# Patient Record
Sex: FEMALE | Race: WHITE | NOT HISPANIC OR LATINO | Employment: OTHER | URBAN - NONMETROPOLITAN AREA
[De-identification: names, ages, dates, MRNs, and addresses within clinical notes are randomized per-mention and may not be internally consistent; named-entity substitution may affect disease eponyms.]

---

## 2017-05-15 ENCOUNTER — TELEPHONE (OUTPATIENT)
Dept: GASTROENTEROLOGY | Facility: CLINIC | Age: 62
End: 2017-05-15

## 2017-06-13 ENCOUNTER — HOSPITAL ENCOUNTER (OUTPATIENT)
Dept: WOMENS IMAGING | Age: 62
Discharge: HOME | End: 2017-06-13
Payer: COMMERCIAL

## 2017-06-13 DIAGNOSIS — M85.80 OSTEOPENIA: ICD-10-CM

## 2017-06-13 PROCEDURE — 77080 DXA BONE DENSITY AXIAL: CPT

## 2018-07-10 ENCOUNTER — HOSPITAL ENCOUNTER (OUTPATIENT)
Dept: WOMENS IMAGING | Age: 63
Discharge: HOME | End: 2018-07-10
Payer: COMMERCIAL

## 2018-07-10 DIAGNOSIS — Z12.39 ENCOUNTER FOR OTHER SCREENING FOR MALIGNANT NEOPLASM OF BREAST: ICD-10-CM

## 2018-07-10 PROCEDURE — 77063 BREAST TOMOSYNTHESIS BI: CPT

## 2019-12-10 ENCOUNTER — HOSPITAL ENCOUNTER (OUTPATIENT)
Dept: WOMENS IMAGING | Age: 64
Discharge: HOME | End: 2019-12-10
Payer: COMMERCIAL

## 2019-12-10 DIAGNOSIS — M85.80 OSTEOPENIA, UNSPECIFIED LOCATION: ICD-10-CM

## 2019-12-10 DIAGNOSIS — Z12.31 ENCOUNTER FOR SCREENING MAMMOGRAM FOR MALIGNANT NEOPLASM OF BREAST: ICD-10-CM

## 2019-12-10 PROCEDURE — 77080 DXA BONE DENSITY AXIAL: CPT

## 2019-12-10 PROCEDURE — 77063 BREAST TOMOSYNTHESIS BI: CPT

## 2020-07-27 ENCOUNTER — TRANSCRIBE ORDERS (OUTPATIENT)
Dept: ADMINISTRATIVE | Facility: HOSPITAL | Age: 65
End: 2020-07-27

## 2020-07-27 DIAGNOSIS — R91.8 OTHER NONSPECIFIC ABNORMAL FINDING OF LUNG FIELD: Primary | ICD-10-CM

## 2020-08-31 ENCOUNTER — HOSPITAL ENCOUNTER (OUTPATIENT)
Dept: CT IMAGING | Facility: HOSPITAL | Age: 65
Discharge: HOME | End: 2020-08-31

## 2020-08-31 ENCOUNTER — TRANSCRIBE ORDERS (OUTPATIENT)
Dept: ADMINISTRATIVE | Facility: HOSPITAL | Age: 65
End: 2020-08-31

## 2020-08-31 ENCOUNTER — HOSPITAL ENCOUNTER (INPATIENT)
Facility: HOSPITAL | Age: 65
LOS: 2 days | Discharge: HOME | End: 2020-09-02
Attending: INTERNAL MEDICINE | Admitting: INTERNAL MEDICINE

## 2020-08-31 DIAGNOSIS — R06.00 DYSPNEA, UNSPECIFIED TYPE: ICD-10-CM

## 2020-08-31 DIAGNOSIS — R06.00 DYSPNEA, UNSPECIFIED TYPE: Primary | ICD-10-CM

## 2020-08-31 PROBLEM — R00.0 SINUS TACHYCARDIA: Status: ACTIVE | Noted: 2020-08-31

## 2020-08-31 PROBLEM — C34.90 ADENOCARCINOMA, LUNG (HCC): Status: ACTIVE | Noted: 2020-08-31

## 2020-08-31 PROBLEM — I26.99 PULMONARY EMBOLUS (HCC): Status: ACTIVE | Noted: 2020-08-31

## 2020-08-31 LAB
ALBUMIN SERPL-MCNC: 4.2 G/DL (ref 3.5–5.2)
ALBUMIN/GLOB SERPL: 1.4 G/DL
ALP SERPL-CCNC: 140 U/L (ref 39–117)
ALT SERPL W P-5'-P-CCNC: 19 U/L (ref 1–33)
ANION GAP SERPL CALCULATED.3IONS-SCNC: 14 MMOL/L (ref 5–15)
APTT PPP: 24.5 SECONDS (ref 24.1–35)
AST SERPL-CCNC: 23 U/L (ref 1–32)
BASOPHILS # BLD AUTO: 0.06 10*3/MM3 (ref 0–0.2)
BASOPHILS NFR BLD AUTO: 0.5 % (ref 0–1.5)
BILIRUB SERPL-MCNC: 0.2 MG/DL (ref 0–1.2)
BUN SERPL-MCNC: 17 MG/DL (ref 8–23)
BUN/CREAT SERPL: 15.6 (ref 7–25)
CALCIUM SPEC-SCNC: 9.6 MG/DL (ref 8.6–10.5)
CHLORIDE SERPL-SCNC: 102 MMOL/L (ref 98–107)
CO2 SERPL-SCNC: 24 MMOL/L (ref 22–29)
CREAT BLDA-MCNC: 1.2 MG/DL (ref 0.6–1.3)
CREAT SERPL-MCNC: 1.09 MG/DL (ref 0.57–1)
DEPRECATED RDW RBC AUTO: 45.2 FL (ref 37–54)
EOSINOPHIL # BLD AUTO: 0.27 10*3/MM3 (ref 0–0.4)
EOSINOPHIL NFR BLD AUTO: 2.2 % (ref 0.3–6.2)
ERYTHROCYTE [DISTWIDTH] IN BLOOD BY AUTOMATED COUNT: 14.1 % (ref 12.3–15.4)
GFR SERPL CREATININE-BSD FRML MDRD: 50 ML/MIN/1.73
GLOBULIN UR ELPH-MCNC: 3 GM/DL
GLUCOSE SERPL-MCNC: 99 MG/DL (ref 65–99)
HCT VFR BLD AUTO: 39.4 % (ref 34–46.6)
HGB BLD-MCNC: 12.8 G/DL (ref 12–15.9)
IMM GRANULOCYTES # BLD AUTO: 0.09 10*3/MM3 (ref 0–0.05)
IMM GRANULOCYTES NFR BLD AUTO: 0.7 % (ref 0–0.5)
INR PPP: 0.99 (ref 0.91–1.09)
LYMPHOCYTES # BLD AUTO: 1.83 10*3/MM3 (ref 0.7–3.1)
LYMPHOCYTES NFR BLD AUTO: 14.8 % (ref 19.6–45.3)
MCH RBC QN AUTO: 28.7 PG (ref 26.6–33)
MCHC RBC AUTO-ENTMCNC: 32.5 G/DL (ref 31.5–35.7)
MCV RBC AUTO: 88.3 FL (ref 79–97)
MONOCYTES # BLD AUTO: 0.98 10*3/MM3 (ref 0.1–0.9)
MONOCYTES NFR BLD AUTO: 7.9 % (ref 5–12)
NEUTROPHILS NFR BLD AUTO: 73.9 % (ref 42.7–76)
NEUTROPHILS NFR BLD AUTO: 9.15 10*3/MM3 (ref 1.7–7)
NRBC BLD AUTO-RTO: 0 /100 WBC (ref 0–0.2)
NT-PROBNP SERPL-MCNC: 5447 PG/ML (ref 0–900)
PLATELET # BLD AUTO: 168 10*3/MM3 (ref 140–450)
PMV BLD AUTO: 11.1 FL (ref 6–12)
POTASSIUM SERPL-SCNC: 4.1 MMOL/L (ref 3.5–5.2)
PROT SERPL-MCNC: 7.2 G/DL (ref 6–8.5)
PROTHROMBIN TIME: 12.7 SECONDS (ref 11.9–14.6)
RBC # BLD AUTO: 4.46 10*6/MM3 (ref 3.77–5.28)
SARS-COV-2 RNA PNL SPEC NAA+PROBE: NOT DETECTED
SODIUM SERPL-SCNC: 140 MMOL/L (ref 136–145)
TROPONIN T SERPL-MCNC: 0.04 NG/ML (ref 0–0.03)
WBC # BLD AUTO: 12.38 10*3/MM3 (ref 3.4–10.8)

## 2020-08-31 PROCEDURE — 82565 ASSAY OF CREATININE: CPT

## 2020-08-31 PROCEDURE — 71275 CT ANGIOGRAPHY CHEST: CPT

## 2020-08-31 PROCEDURE — 83880 ASSAY OF NATRIURETIC PEPTIDE: CPT | Performed by: INTERNAL MEDICINE

## 2020-08-31 PROCEDURE — 85025 COMPLETE CBC W/AUTO DIFF WBC: CPT | Performed by: INTERNAL MEDICINE

## 2020-08-31 PROCEDURE — 84484 ASSAY OF TROPONIN QUANT: CPT | Performed by: INTERNAL MEDICINE

## 2020-08-31 PROCEDURE — 25010000002 ENOXAPARIN PER 10 MG: Performed by: INTERNAL MEDICINE

## 2020-08-31 PROCEDURE — 0 IOPAMIDOL PER 1 ML: Performed by: INTERNAL MEDICINE

## 2020-08-31 PROCEDURE — 99223 1ST HOSP IP/OBS HIGH 75: CPT | Performed by: INTERNAL MEDICINE

## 2020-08-31 PROCEDURE — 93005 ELECTROCARDIOGRAM TRACING: CPT | Performed by: INTERNAL MEDICINE

## 2020-08-31 PROCEDURE — 80053 COMPREHEN METABOLIC PANEL: CPT | Performed by: INTERNAL MEDICINE

## 2020-08-31 PROCEDURE — 87635 SARS-COV-2 COVID-19 AMP PRB: CPT | Performed by: INTERNAL MEDICINE

## 2020-08-31 PROCEDURE — 85730 THROMBOPLASTIN TIME PARTIAL: CPT | Performed by: INTERNAL MEDICINE

## 2020-08-31 PROCEDURE — 85610 PROTHROMBIN TIME: CPT | Performed by: INTERNAL MEDICINE

## 2020-08-31 RX ORDER — LEVOTHYROXINE SODIUM 0.1 MG/1
100 TABLET ORAL
Status: DISCONTINUED | OUTPATIENT
Start: 2020-09-01 | End: 2020-09-01

## 2020-08-31 RX ORDER — SERTRALINE HYDROCHLORIDE 100 MG/1
100 TABLET, FILM COATED ORAL DAILY
Status: DISCONTINUED | OUTPATIENT
Start: 2020-09-01 | End: 2020-09-02 | Stop reason: HOSPADM

## 2020-08-31 RX ORDER — TRAZODONE HYDROCHLORIDE 50 MG/1
25 TABLET ORAL NIGHTLY
Status: DISCONTINUED | OUTPATIENT
Start: 2020-09-01 | End: 2020-09-02 | Stop reason: HOSPADM

## 2020-08-31 RX ORDER — TRAZODONE HYDROCHLORIDE 100 MG/1
100 TABLET ORAL NIGHTLY
Status: DISCONTINUED | OUTPATIENT
Start: 2020-08-31 | End: 2020-08-31

## 2020-08-31 RX ORDER — LOSARTAN POTASSIUM 50 MG/1
50 TABLET ORAL
Status: DISCONTINUED | OUTPATIENT
Start: 2020-09-01 | End: 2020-09-01

## 2020-08-31 RX ADMIN — ENOXAPARIN SODIUM 80 MG: 80 INJECTION SUBCUTANEOUS at 18:23

## 2020-08-31 RX ADMIN — IOPAMIDOL 84 ML: 755 INJECTION, SOLUTION INTRAVENOUS at 15:47

## 2020-08-31 RX ADMIN — TRAZODONE HYDROCHLORIDE 25 MG: 50 TABLET ORAL at 22:27

## 2020-09-01 ENCOUNTER — APPOINTMENT (OUTPATIENT)
Dept: ULTRASOUND IMAGING | Facility: HOSPITAL | Age: 65
End: 2020-09-01

## 2020-09-01 ENCOUNTER — APPOINTMENT (OUTPATIENT)
Dept: CARDIOLOGY | Facility: HOSPITAL | Age: 65
End: 2020-09-01

## 2020-09-01 ENCOUNTER — APPOINTMENT (OUTPATIENT)
Dept: MRI IMAGING | Facility: HOSPITAL | Age: 65
End: 2020-09-01

## 2020-09-01 LAB
ANION GAP SERPL CALCULATED.3IONS-SCNC: 12 MMOL/L (ref 5–15)
BASOPHILS # BLD AUTO: 0.05 10*3/MM3 (ref 0–0.2)
BASOPHILS NFR BLD AUTO: 0.5 % (ref 0–1.5)
BH CV ECHO MEAS - AO MAX PG (FULL): 3.4 MMHG
BH CV ECHO MEAS - AO MAX PG: 7.3 MMHG
BH CV ECHO MEAS - AO MEAN PG (FULL): 1.5 MMHG
BH CV ECHO MEAS - AO MEAN PG: 3.5 MMHG
BH CV ECHO MEAS - AO ROOT AREA (BSA CORRECTED): 1.6
BH CV ECHO MEAS - AO ROOT AREA: 6.2 CM^2
BH CV ECHO MEAS - AO ROOT DIAM: 2.8 CM
BH CV ECHO MEAS - AO V2 MAX: 135 CM/SEC
BH CV ECHO MEAS - AO V2 MEAN: 84.7 CM/SEC
BH CV ECHO MEAS - AO V2 VTI: 21.7 CM
BH CV ECHO MEAS - AVA(I,A): 2.1 CM^2
BH CV ECHO MEAS - AVA(I,D): 2.1 CM^2
BH CV ECHO MEAS - AVA(V,A): 2.3 CM^2
BH CV ECHO MEAS - AVA(V,D): 2.3 CM^2
BH CV ECHO MEAS - BSA(HAYCOCK): 1.8 M^2
BH CV ECHO MEAS - BSA: 1.8 M^2
BH CV ECHO MEAS - BZI_BMI: 26 KILOGRAMS/M^2
BH CV ECHO MEAS - BZI_METRIC_HEIGHT: 165.1 CM
BH CV ECHO MEAS - BZI_METRIC_WEIGHT: 70.8 KG
BH CV ECHO MEAS - EDV(CUBED): 47.4 ML
BH CV ECHO MEAS - EDV(MOD-SP4): 45.2 ML
BH CV ECHO MEAS - EDV(TEICH): 55.2 ML
BH CV ECHO MEAS - EF(CUBED): 78.5 %
BH CV ECHO MEAS - EF(MOD-SP4): 73.7 %
BH CV ECHO MEAS - EF(TEICH): 71.6 %
BH CV ECHO MEAS - ESV(CUBED): 10.2 ML
BH CV ECHO MEAS - ESV(MOD-SP4): 11.9 ML
BH CV ECHO MEAS - ESV(TEICH): 15.7 ML
BH CV ECHO MEAS - FS: 40.1 %
BH CV ECHO MEAS - IVS/LVPW: 1.4
BH CV ECHO MEAS - IVSD: 1.3 CM
BH CV ECHO MEAS - LA DIMENSION: 3.1 CM
BH CV ECHO MEAS - LA/AO: 1.1
BH CV ECHO MEAS - LAT PEAK E' VEL: 7.3 CM/SEC
BH CV ECHO MEAS - LV DIASTOLIC VOL/BSA (35-75): 25.4 ML/M^2
BH CV ECHO MEAS - LV MASS(C)D: 119.3 GRAMS
BH CV ECHO MEAS - LV MASS(C)DI: 67 GRAMS/M^2
BH CV ECHO MEAS - LV MAX PG: 3.9 MMHG
BH CV ECHO MEAS - LV MEAN PG: 2 MMHG
BH CV ECHO MEAS - LV SYSTOLIC VOL/BSA (12-30): 6.7 ML/M^2
BH CV ECHO MEAS - LV V1 MAX: 99.2 CM/SEC
BH CV ECHO MEAS - LV V1 MEAN: 58.7 CM/SEC
BH CV ECHO MEAS - LV V1 VTI: 14.4 CM
BH CV ECHO MEAS - LVIDD: 3.6 CM
BH CV ECHO MEAS - LVIDS: 2.2 CM
BH CV ECHO MEAS - LVLD AP4: 7.3 CM
BH CV ECHO MEAS - LVLS AP4: 5.5 CM
BH CV ECHO MEAS - LVOT AREA (M): 3.1 CM^2
BH CV ECHO MEAS - LVOT AREA: 3.1 CM^2
BH CV ECHO MEAS - LVOT DIAM: 2 CM
BH CV ECHO MEAS - LVPWD: 0.87 CM
BH CV ECHO MEAS - MED PEAK E' VEL: 5.66 CM/SEC
BH CV ECHO MEAS - MV A MAX VEL: 81.3 CM/SEC
BH CV ECHO MEAS - MV DEC TIME: 0.3 SEC
BH CV ECHO MEAS - MV E MAX VEL: 55.6 CM/SEC
BH CV ECHO MEAS - MV E/A: 0.68
BH CV ECHO MEAS - RAP SYSTOLE: 5 MMHG
BH CV ECHO MEAS - RVSP: 46 MMHG
BH CV ECHO MEAS - SI(AO): 74.9 ML/M^2
BH CV ECHO MEAS - SI(CUBED): 20.9 ML/M^2
BH CV ECHO MEAS - SI(LVOT): 25.4 ML/M^2
BH CV ECHO MEAS - SI(MOD-SP4): 18.7 ML/M^2
BH CV ECHO MEAS - SI(TEICH): 22.2 ML/M^2
BH CV ECHO MEAS - SV(AO): 133.3 ML
BH CV ECHO MEAS - SV(CUBED): 37.2 ML
BH CV ECHO MEAS - SV(LVOT): 45.2 ML
BH CV ECHO MEAS - SV(MOD-SP4): 33.3 ML
BH CV ECHO MEAS - SV(TEICH): 39.5 ML
BH CV ECHO MEAS - TR MAX VEL: 320 CM/SEC
BH CV ECHO MEASUREMENTS AVERAGE E/E' RATIO: 8.58
BUN SERPL-MCNC: 17 MG/DL (ref 8–23)
BUN/CREAT SERPL: 16.8 (ref 7–25)
CALCIUM SPEC-SCNC: 9.2 MG/DL (ref 8.6–10.5)
CHLORIDE SERPL-SCNC: 103 MMOL/L (ref 98–107)
CO2 SERPL-SCNC: 22 MMOL/L (ref 22–29)
CREAT SERPL-MCNC: 1.01 MG/DL (ref 0.57–1)
DEPRECATED RDW RBC AUTO: 45.1 FL (ref 37–54)
EOSINOPHIL # BLD AUTO: 0.37 10*3/MM3 (ref 0–0.4)
EOSINOPHIL NFR BLD AUTO: 3.9 % (ref 0.3–6.2)
ERYTHROCYTE [DISTWIDTH] IN BLOOD BY AUTOMATED COUNT: 14.2 % (ref 12.3–15.4)
GFR SERPL CREATININE-BSD FRML MDRD: 55 ML/MIN/1.73
GLUCOSE SERPL-MCNC: 104 MG/DL (ref 65–99)
HCT VFR BLD AUTO: 36.7 % (ref 34–46.6)
HGB BLD-MCNC: 12 G/DL (ref 12–15.9)
IMM GRANULOCYTES # BLD AUTO: 0.07 10*3/MM3 (ref 0–0.05)
IMM GRANULOCYTES NFR BLD AUTO: 0.7 % (ref 0–0.5)
LEFT ATRIUM VOLUME INDEX: 12.5 ML/M2
LEFT ATRIUM VOLUME: 22.5 CM3
LYMPHOCYTES # BLD AUTO: 2.3 10*3/MM3 (ref 0.7–3.1)
LYMPHOCYTES NFR BLD AUTO: 24.3 % (ref 19.6–45.3)
MAXIMAL PREDICTED HEART RATE: 155 BPM
MCH RBC QN AUTO: 28.7 PG (ref 26.6–33)
MCHC RBC AUTO-ENTMCNC: 32.7 G/DL (ref 31.5–35.7)
MCV RBC AUTO: 87.8 FL (ref 79–97)
MONOCYTES # BLD AUTO: 0.69 10*3/MM3 (ref 0.1–0.9)
MONOCYTES NFR BLD AUTO: 7.3 % (ref 5–12)
NEUTROPHILS NFR BLD AUTO: 6 10*3/MM3 (ref 1.7–7)
NEUTROPHILS NFR BLD AUTO: 63.3 % (ref 42.7–76)
NRBC BLD AUTO-RTO: 0 /100 WBC (ref 0–0.2)
PLATELET # BLD AUTO: 164 10*3/MM3 (ref 140–450)
PMV BLD AUTO: 11.7 FL (ref 6–12)
POTASSIUM SERPL-SCNC: 4.2 MMOL/L (ref 3.5–5.2)
RBC # BLD AUTO: 4.18 10*6/MM3 (ref 3.77–5.28)
SODIUM SERPL-SCNC: 137 MMOL/L (ref 136–145)
STRESS TARGET HR: 132 BPM
WBC # BLD AUTO: 9.48 10*3/MM3 (ref 3.4–10.8)

## 2020-09-01 PROCEDURE — 99232 SBSQ HOSP IP/OBS MODERATE 35: CPT | Performed by: INTERNAL MEDICINE

## 2020-09-01 PROCEDURE — 93306 TTE W/DOPPLER COMPLETE: CPT

## 2020-09-01 PROCEDURE — 93010 ELECTROCARDIOGRAM REPORT: CPT | Performed by: INTERNAL MEDICINE

## 2020-09-01 PROCEDURE — 93970 EXTREMITY STUDY: CPT | Performed by: SURGERY

## 2020-09-01 PROCEDURE — 0 GADOBENATE DIMEGLUMINE 529 MG/ML SOLUTION: Performed by: INTERNAL MEDICINE

## 2020-09-01 PROCEDURE — 85025 COMPLETE CBC W/AUTO DIFF WBC: CPT | Performed by: INTERNAL MEDICINE

## 2020-09-01 PROCEDURE — 25010000002 ENOXAPARIN PER 10 MG: Performed by: INTERNAL MEDICINE

## 2020-09-01 PROCEDURE — A9577 INJ MULTIHANCE: HCPCS | Performed by: INTERNAL MEDICINE

## 2020-09-01 PROCEDURE — 70553 MRI BRAIN STEM W/O & W/DYE: CPT

## 2020-09-01 PROCEDURE — 93970 EXTREMITY STUDY: CPT

## 2020-09-01 PROCEDURE — 80048 BASIC METABOLIC PNL TOTAL CA: CPT | Performed by: INTERNAL MEDICINE

## 2020-09-01 PROCEDURE — 93306 TTE W/DOPPLER COMPLETE: CPT | Performed by: INTERNAL MEDICINE

## 2020-09-01 PROCEDURE — 25010000002 PERFLUTREN 6.52 MG/ML SUSPENSION: Performed by: INTERNAL MEDICINE

## 2020-09-01 RX ORDER — LEVOTHYROXINE SODIUM 88 UG/1
88 TABLET ORAL
Status: DISCONTINUED | OUTPATIENT
Start: 2020-09-02 | End: 2020-09-02 | Stop reason: HOSPADM

## 2020-09-01 RX ORDER — ROSUVASTATIN CALCIUM 10 MG/1
10 TABLET, COATED ORAL DAILY
COMMUNITY

## 2020-09-01 RX ORDER — IRBESARTAN 150 MG/1
150 TABLET ORAL DAILY
COMMUNITY
End: 2020-09-02 | Stop reason: HOSPADM

## 2020-09-01 RX ORDER — ALBUTEROL SULFATE 90 UG/1
2 AEROSOL, METERED RESPIRATORY (INHALATION) EVERY 4 HOURS PRN
COMMUNITY
End: 2021-01-01

## 2020-09-01 RX ORDER — CIDER VINEGAR 300 MG
500 TABLET ORAL DAILY
COMMUNITY
End: 2021-01-01

## 2020-09-01 RX ORDER — UBIDECARENONE 100 MG
200 CAPSULE ORAL DAILY
COMMUNITY
End: 2021-01-01

## 2020-09-01 RX ORDER — CETIRIZINE HYDROCHLORIDE 10 MG/1
10 TABLET ORAL DAILY
COMMUNITY
End: 2021-01-01 | Stop reason: ALTCHOICE

## 2020-09-01 RX ORDER — ROSUVASTATIN CALCIUM 10 MG/1
10 TABLET, COATED ORAL DAILY
Status: DISCONTINUED | OUTPATIENT
Start: 2020-09-02 | End: 2020-09-02 | Stop reason: HOSPADM

## 2020-09-01 RX ORDER — CETIRIZINE HYDROCHLORIDE 10 MG/1
10 TABLET ORAL DAILY
Status: DISCONTINUED | OUTPATIENT
Start: 2020-09-02 | End: 2020-09-02 | Stop reason: HOSPADM

## 2020-09-01 RX ORDER — PHENOL 1.4 %
600 AEROSOL, SPRAY (ML) MUCOUS MEMBRANE 2 TIMES DAILY WITH MEALS
COMMUNITY
End: 2021-01-01

## 2020-09-01 RX ORDER — NEOMYCIN/POLYMYXIN B/PRAMOXINE 3.5-10K-1
1300 CREAM (GRAM) TOPICAL DAILY
COMMUNITY
End: 2021-01-01

## 2020-09-01 RX ADMIN — GADOBENATE DIMEGLUMINE 14 ML: 529 INJECTION, SOLUTION INTRAVENOUS at 09:30

## 2020-09-01 RX ADMIN — LEVOTHYROXINE SODIUM 100 MCG: 100 TABLET ORAL at 06:06

## 2020-09-01 RX ADMIN — ENOXAPARIN SODIUM 80 MG: 80 INJECTION SUBCUTANEOUS at 17:22

## 2020-09-01 RX ADMIN — PERFLUTREN: 6.52 INJECTION, SUSPENSION INTRAVENOUS at 10:33

## 2020-09-01 RX ADMIN — TRAZODONE HYDROCHLORIDE 25 MG: 50 TABLET ORAL at 22:07

## 2020-09-01 RX ADMIN — ENOXAPARIN SODIUM 80 MG: 80 INJECTION SUBCUTANEOUS at 06:06

## 2020-09-01 RX ADMIN — SERTRALINE 100 MG: 100 TABLET, FILM COATED ORAL at 10:43

## 2020-09-01 NOTE — CONSULTS
[facility] CONSULT NOTE    Patient Care Team:  Toño Sheldon MD as PCP - General (Internal Medicine)  Toño Sheldon MD  REASON FOR REFERRAL: ? Candidate for EKOS  Chief complaint: CODY Carlson     Patient is a 65 y.o. female who reports she has been short of breath for about the last 6 weeks.  Shortness of breath primarily occurs with moderate degrees of exertion.  Work-up for this included a CT scan done at outside facility which showed lung cancer.  She has been seen in [place] for this, presumably with a biopsy as she does carry the diagnosis of non-small cell lung cancer (adenocarcinoma).  She was supposed to be going back to [place] later this week for completion of full work-up and discuss initiation of treatment.  However, yesterday she experienced an acute worsening of shortness of breath while walking around [facility].  She is continued to have moderate shortness of breath with very little exertional activity ever since.  No associated chest pain, nor leg swelling or pain.  No associated cough or hemoptysis.  Symptoms have been consistent since onset yesterday.  She reports she is not short of breath and otherwise rather comfortable while at rest.      Review of Systems   Review of Systems   Constitutional: Negative for fatigue and unexpected weight change.   HENT: Negative for nosebleeds and trouble swallowing.    Respiratory: Positive for shortness of breath. Negative for cough and wheezing.    Cardiovascular: Negative for chest pain, palpitations and leg swelling.   Gastrointestinal: Negative for abdominal distention, abdominal pain, blood in stool and vomiting.   Endocrine: Negative for cold intolerance, heat intolerance, polydipsia, polyphagia and polyuria.   Genitourinary: Negative for hematuria and vaginal bleeding.   Musculoskeletal: Negative for arthralgias and joint swelling.   Skin: Negative for color change and pallor.   Neurological: Negative for  seizures, syncope and weakness.   Hematological: Does not bruise/bleed easily.   Psychiatric/Behavioral: Negative for confusion. The patient is not nervous/anxious.        History  Past Medical History:   Diagnosis Date   • Arthropathy    • Depression    • Hyperlipidemia    • Hypertension    • Myalgia    • Small cell lung cancer in adult (CMS/HCC)      Past Surgical History:   Procedure Laterality Date   • BREAST BIOPSY     • COLONOSCOPY  01/2010     No family history on file.  Social History     Tobacco Use   • Smoking status: Never Smoker   • Smokeless tobacco: Never Used   Substance Use Topics   • Alcohol use: Yes     Comment: occasional    • Drug use: Never     Medications Prior to Admission   Medication Sig Dispense Refill Last Dose   • Flaxseed, Linseed, (FLAXSEED OIL PO) Take  by mouth.      • levothyroxine (SYNTHROID, LEVOTHROID) 100 MCG tablet Take 100 mcg by mouth Daily.      • meloxicam (MOBIC) 15 MG tablet Take 15 mg by mouth Daily.      • Multiple Vitamins-Minerals (MULTIVITAMIN ADULT PO) Take  by mouth.      • Nutritional Supplements (ESTROVEN PO) Take  by mouth.      • olmesartan (BENICAR) 40 MG tablet Take 40 mg by mouth Daily.      • Omega-3 Fatty Acids (FISH OIL) 1000 MG capsule capsule Take  by mouth Daily With Breakfast.      • Risedronate Sodium (ACTONEL PO) Take  by mouth.      • sertraline (ZOLOFT) 100 MG tablet Take 100 mg by mouth Daily.      • SIMVASTATIN PO Take  by mouth.      • traZODone (DESYREL) 100 MG tablet Take 100 mg by mouth Every Night.      • vitamin C (ASCORBIC ACID) 250 MG tablet Take 250 mg by mouth Daily.        Allergies:  Patient has no known allergies.    Objective     Vital Signs  Temp:  [98.5 °F (36.9 °C)] 98.5 °F (36.9 °C)  Heart Rate:  [83-99] 99  Resp:  [20-22] 22  BP: (133-147)/(82-90) 147/90    Physical Exam:  Physical Exam   Constitutional: No distress.   HENT:   Mouth/Throat: Oropharynx is clear. Pharynx is normal.   Neck: Normal range of motion and thyroid  normal. Neck supple. No JVD present. No thyromegaly present.   Cardiovascular: Normal rate, regular rhythm, S1 normal, S2 normal, normal heart sounds, intact distal pulses and normal pulses.  No extrasystoles are present. PMI is not displaced.   Pulmonary/Chest: Effort normal. She has bibasilar rales.   Abdominal: Soft. Bowel sounds are normal. She exhibits no distension. There is no splenomegaly or hepatomegaly. There is no tenderness.   Musculoskeletal: She exhibits no edema or tenderness.   Neurological: She is alert and oriented to person, place, and time.   Skin: Skin is warm and dry.   Right calf warm to touch when compared to left     Results Review:   Lab Results (last 72 hours)     Procedure Component Value Units Date/Time    COVID-19,[facility] IN-HOUSE,Nasal Swab No Transport Media 3-4 HR TAT - Swab, Nasal Cavity [[id number]]  (Normal) Collected:  08/31/20 1827    Specimen:  Swab from Nasal Cavity Updated:  08/31/20 1928     COVID19 Not Detected    Narrative:       Fact sheet for providers: https://www.fda.gov/media/039537/download     Fact sheet for patients: https://www.fda.gov/media/329924/download    Troponin [[id number]]  (Abnormal) Collected:  08/31/20 1729    Specimen:  Blood Updated:  08/31/20 1757     Troponin T 0.044 ng/mL     Narrative:       Troponin T Reference Range:  <= 0.03 ng/mL-   Negative for AMI  >0.03 ng/mL-     Abnormal for myocardial necrosis.  Clinicians would have to utilize clinical acumen, EKG, Troponin and serial changes to determine if it is an Acute Myocardial Infarction or myocardial injury due to an underlying chronic condition.       Results may be falsely decreased if patient taking Biotin.      Comprehensive Metabolic Panel [[id number]]  (Abnormal) Collected:  08/31/20 1729    Specimen:  Blood Updated:  08/31/20 1754     Glucose 99 mg/dL      BUN 17 mg/dL      Creatinine 1.09 mg/dL      Sodium 140 mmol/L      Potassium 4.1 mmol/L      Chloride 102 mmol/L      CO2 24.0 mmol/L       Calcium 9.6 mg/dL      Total Protein 7.2 g/dL      Albumin 4.20 g/dL      ALT (SGPT) 19 U/L      AST (SGOT) 23 U/L      Alkaline Phosphatase 140 U/L      Total Bilirubin 0.2 mg/dL      eGFR Non African Amer 50 mL/min/1.73      Globulin 3.0 gm/dL      A/G Ratio 1.4 g/dL      BUN/Creatinine Ratio 15.6     Anion Gap 14.0 mmol/L     Narrative:       GFR Normal >60  Chronic Kidney Disease <60  Kidney Failure <15      BNP [[id number]]  (Abnormal) Collected:  08/31/20 1729    Specimen:  Blood Updated:  08/31/20 1751     proBNP 5,447.0 pg/mL     Narrative:       Among patients with dyspnea, NT-proBNP is highly sensitive for the detection of acute congestive heart failure. In addition NT-proBNP of <300 pg/ml effectively rules out acute congestive heart failure with 99% negative predictive value.    Results may be falsely decreased if patient taking Biotin.      aPTT [[id number]]  (Normal) Collected:  08/31/20 1729    Specimen:  Blood Updated:  08/31/20 1744     PTT 24.5 seconds     Protime-INR [[id number]]  (Normal) Collected:  08/31/20 1729    Specimen:  Blood Updated:  08/31/20 1744     Protime 12.7 Seconds      INR 0.99    CBC Auto Differential [[id number]]  (Abnormal) Collected:  08/31/20 1729    Specimen:  Blood Updated:  08/31/20 1736     WBC 12.38 10*3/mm3      RBC 4.46 10*6/mm3      Hemoglobin 12.8 g/dL      Hematocrit 39.4 %      MCV 88.3 fL      MCH 28.7 pg      MCHC 32.5 g/dL      RDW 14.1 %      RDW-SD 45.2 fl      MPV 11.1 fL      Platelets 168 10*3/mm3      Neutrophil % 73.9 %      Lymphocyte % 14.8 %      Monocyte % 7.9 %      Eosinophil % 2.2 %      Basophil % 0.5 %      Immature Grans % 0.7 %      Neutrophils, Absolute 9.15 10*3/mm3      Lymphocytes, Absolute 1.83 10*3/mm3      Monocytes, Absolute 0.98 10*3/mm3      Eosinophils, Absolute 0.27 10*3/mm3      Basophils, Absolute 0.06 10*3/mm3      Immature Grans, Absolute 0.09 10*3/mm3      nRBC 0.0 /100 WBC         CT scan reviewed: Mild right heart  "enlargement (RV: LV 4: 3), with moderate clot burden of bilateral pulmonary emboli    ECG still pending    Telemetry reviewed: Sinus rhythm with rate in the 80s       Assessment/Plan       Pulmonary embolus (CMS/HCC)    Sinus tachycardia    Adenocarcinoma, lung (CMS/HCC)      1.  Submassive PE: With RV enlargement demonstrated on CT scan, and minimal biomarker elevation, technically the patient would classify as having \"submassive pulmonary embolism.\"  This would qualify her for thrombolytic therapy; however, at this point, I think the potential risk versus benefit ratio is too uncertain to proceed urgently with EKOS catheter placement for ultrasound-assisted catheter-directed thrombolytic administration.  Though she does have some biomarker elevation suggestive of RV strain, her heart rate is normal at rest suggesting no significant strain for now.  Her sPESI=1 (d/t known cancer).  For now, I recommend the following (and have discussed this with the referring physician, Dr. Sheldon):  -Remain in ICU for ongoing telemetry monitoring  -Strict bedrest  -Full anticoagulation (lovenox 1mg/mg SC q12hr)  -Neuro-imaging to exclude brain metastasis; if present, would absolutely not proceed with any form of thrombolytic therapy  -We will reassess clinical status tomorrow, coupled with neuro-imaging results, to decide if catheter-directed thrombolytic therapy would be of more benefit than risk    I discussed the patient's findings and my recommendations with patient, CCU nursing staff, and Dr. Sheldon.     Gerson Sheets MD  08/31/20  21:08        "

## 2020-09-01 NOTE — PLAN OF CARE
Problem: Patient Care Overview  Goal: Plan of Care Review  Outcome: Ongoing (interventions implemented as appropriate)  Flowsheets (Taken 9/1/2020 0519)  Progress: no change  Plan of Care Reviewed With: patient  Outcome Summary: No c/o of pain or SOB. VSS. Sats 90's on room air. MRI, Echo, & Venous doppler this AM. Continue to monitor

## 2020-09-01 NOTE — PROGRESS NOTES
[facility] -  Progress Note     LOS: 1 day   Patient Care Team:  Toño Sheldon MD as PCP - General (Internal Medicine)    Chief Complaint: Follow-up submassive PE    Subjective     Interval History: Patient remains in bed with no new symptoms of dyspnea, chest pain, palpitations, lightheadedness while at rest.  She did ambulate to the bedside commode earlier, with slight dyspnea then.  Otherwise, no complaints.    Review of Systems:  Review of Systems   Constitution: Negative for malaise/fatigue.   Cardiovascular: Positive for dyspnea on exertion. Negative for chest pain, claudication, leg swelling, near-syncope, orthopnea, palpitations, paroxysmal nocturnal dyspnea and syncope.   Respiratory: Negative for shortness of breath.    Hematologic/Lymphatic: Does not bruise/bleed easily.       Vital Sign Min/Max for last 24 hours  Temp  Min: 97.7 °F (36.5 °C)  Max: 98.5 °F (36.9 °C)   BP  Min: 97/56  Max: 147/80   Pulse  Min: 75  Max: 101   Resp  Min: 14  Max: 22   SpO2  Min: 92 %  Max: 97 %   No data recorded   Weight  Min: 71.1 kg (156 lb 12 oz)  Max: 71.2 kg (156 lb 15.5 oz)         09/01/20  0500   Weight: 71.2 kg (156 lb 15.5 oz)       Physical Exam:   Physical Exam   Constitutional: No distress.   Neck: No JVD present.   Cardiovascular: Normal rate, regular rhythm, S1 normal, S2 normal, normal heart sounds, intact distal pulses and normal pulses.   No murmur heard.  Pulmonary/Chest: Effort normal and breath sounds normal.   Abdominal: Soft. There is no tenderness.   Neurological: She is alert and oriented to person, place, and time.   Skin: Skin is warm and dry.       Medication Review: yes  Current Facility-Administered Medications   Medication Dose Route Frequency Provider Last Rate Last Dose   • enoxaparin (LOVENOX) syringe 80 mg  80 mg Subcutaneous Q12H Toño Sheldon MD   80 mg at 09/01/20 0606   • [START ON 9/2/2020] levothyroxine (SYNTHROID, LEVOTHROID) tablet 88 mcg   "88 mcg Oral Q AM Toño Sheldon MD       • sertraline (ZOLOFT) tablet 100 mg  100 mg Oral Daily Toño Sheldon MD   100 mg at 09/01/20 1043   • traZODone (DESYREL) tablet 25 mg  25 mg Oral Nightly Toño Sheldon MD   25 mg at 08/31/20 2227         Results Review:   I have reviewed all laboratory data, with pertinent findings: CBC unremarkable.  BMP unremarkable as well.    MRI of the head reviewed: No lesions noted.    I have reviewed telemetry, which shows sinus rhythm with rates in the 80s-90s.  No arrhythmias.  Only \"bouts\" of sinus tachycardia were when ambulating to the bedside commode, short-lived, up to 120s maximum.    ECG reviewed: Sinus rhythm, anterior T wave inversions    Results for orders placed during the hospital encounter of 08/31/20   Transthoracic Echo Complete With Contrast if Necessary Per Protocol    Narrative · Right ventricular cavity is mild-to-moderately dilated with normal   function and no Martinez's sign.  · Systolic flattening of the interventricular septum consistent with right   ventricle pressure overload.  · Estimated right ventricular systolic pressure from tricuspid   regurgitation is moderately elevated (45-55 mmHg).  · Left ventricular systolic function is hyperdynamic (EF > 70).  · Left ventricular diastolic dysfunction (grade I) consistent with   impaired relaxation.  · Left ventricular wall thickness is consistent with mild asymmetric   hypertrophy.  · No significant valvular pathology.            Assessment/Plan     1.  Submassive PE: Clinically still doing well with no elevation heart rate nor any new requirement for oxygen.  -Given the reasons outlined in the initial consultation note, now with 24 hours of close monitoring without physiologic worsening, I will recommend standard therapy with therapeutic dosing of Lovenox.  At this point, given the lesion in her lung, I think the risk of thrombolytic therapy administration outweighs the possible " benefit.  I discussed this at length with the patient (who fully understands and agrees), Farideh (CCU nurse today), and Dr. Sheldon.  -Further plans (went to transfer out of ICU, which anticoagulation to use and for how long, activity restrictions, etc.) per Dr. Sheldon    Cardiology will sign off.  Please do not hesitate to contact me back with any further questions, or certainly if her clinical condition worsens (as some studies of EKOS therapy have included patients who are up to 7 days out from initial diagnosis of PE).    Gerson Sheets MD  09/01/20  16:07

## 2020-09-01 NOTE — PROGRESS NOTES
Chief Complaint: Shortness of breath      Interval History:     Breathing is been okay at rest.  Not having any chest pain.  No bleeding complications.    Review of Systems:   General ROS: negative for - chills or fever  Gastrointestinal ROS: negative for - abdominal pain, diarrhea or nausea/vomiting       Vital Signs  Temp:  [97.7 °F (36.5 °C)-98.5 °F (36.9 °C)] 97.8 °F (36.6 °C)  Heart Rate:  [] 75  Resp:  [16-22] 16  BP: ()/(44-90) 97/56    Intake/Output Summary (Last 24 hours) at 9/1/2020 0753  Last data filed at 9/1/2020 0449  Gross per 24 hour   Intake --   Output 700 ml   Net -700 ml       Physical Exam:     General Appearance:    Alert, cooperative, in no acute distress   Head:    N/A   Throat:   N/A   Neck:   N/A   Lungs:     Clear to auscultation,respirations regular, even and                  unlabored    Heart:    Regular rhythm and normal rate, normal S1 and S2, no            murmur, no gallop, no rub   Abdomen:     Normal bowel sounds, no masses, no organomegaly, soft        non-tender, non-distended, no guarding, no rebound                tenderness   Extremities:   No edema, no cyanosis, no clubbing   Pulses:   N/A   Skin:   N/A   Lymph nodes:   N/A   Neurologic:   N/A          Lab Review:       Lab Results (last 24 hours)     Procedure Component Value Units Date/Time    Basic Metabolic Panel [[id number]]  (Abnormal) Collected:  09/01/20 0254    Specimen:  Blood Updated:  09/01/20 0326     Glucose 104 mg/dL      BUN 17 mg/dL      Creatinine 1.01 mg/dL      Sodium 137 mmol/L      Potassium 4.2 mmol/L      Chloride 103 mmol/L      CO2 22.0 mmol/L      Calcium 9.2 mg/dL      eGFR Non African Amer 55 mL/min/1.73      BUN/Creatinine Ratio 16.8     Anion Gap 12.0 mmol/L     Narrative:       GFR Normal >60  Chronic Kidney Disease <60  Kidney Failure <15      CBC & Differential [[id number]] Collected:  09/01/20 0254    Specimen:  Blood Updated:  09/01/20 0311    Narrative:       The following  orders were created for panel order CBC & Differential.  Procedure                               Abnormality         Status                     ---------                               -----------         ------                     CBC Auto Differential[[id number]]        Abnormal            Final result                 Please view results for these tests on the individual orders.    CBC Auto Differential [[id number]]  (Abnormal) Collected:  09/01/20 0254    Specimen:  Blood Updated:  09/01/20 0311     WBC 9.48 10*3/mm3      RBC 4.18 10*6/mm3      Hemoglobin 12.0 g/dL      Hematocrit 36.7 %      MCV 87.8 fL      MCH 28.7 pg      MCHC 32.7 g/dL      RDW 14.2 %      RDW-SD 45.1 fl      MPV 11.7 fL      Platelets 164 10*3/mm3      Neutrophil % 63.3 %      Lymphocyte % 24.3 %      Monocyte % 7.3 %      Eosinophil % 3.9 %      Basophil % 0.5 %      Immature Grans % 0.7 %      Neutrophils, Absolute 6.00 10*3/mm3      Lymphocytes, Absolute 2.30 10*3/mm3      Monocytes, Absolute 0.69 10*3/mm3      Eosinophils, Absolute 0.37 10*3/mm3      Basophils, Absolute 0.05 10*3/mm3      Immature Grans, Absolute 0.07 10*3/mm3      nRBC 0.0 /100 WBC     COVID-19,[facility] IN-HOUSE,Nasal Swab No Transport Media 3-4 HR TAT - Swab, Nasal Cavity [[id number]]  (Normal) Collected:  08/31/20 1827    Specimen:  Swab from Nasal Cavity Updated:  08/31/20 1928     COVID19 Not Detected    Narrative:       Fact sheet for providers: https://www.fda.gov/media/438499/download     Fact sheet for patients: https://www.fda.gov/media/808139/download    Troponin [[id number]]  (Abnormal) Collected:  08/31/20 1729    Specimen:  Blood Updated:  08/31/20 1757     Troponin T 0.044 ng/mL     Narrative:       Troponin T Reference Range:  <= 0.03 ng/mL-   Negative for AMI  >0.03 ng/mL-     Abnormal for myocardial necrosis.  Clinicians would have to utilize clinical acumen, EKG, Troponin and serial changes to determine if it is an Acute Myocardial Infarction or myocardial  injury due to an underlying chronic condition.       Results may be falsely decreased if patient taking Biotin.      Comprehensive Metabolic Panel [[id number]]  (Abnormal) Collected:  08/31/20 1729    Specimen:  Blood Updated:  08/31/20 1754     Glucose 99 mg/dL      BUN 17 mg/dL      Creatinine 1.09 mg/dL      Sodium 140 mmol/L      Potassium 4.1 mmol/L      Chloride 102 mmol/L      CO2 24.0 mmol/L      Calcium 9.6 mg/dL      Total Protein 7.2 g/dL      Albumin 4.20 g/dL      ALT (SGPT) 19 U/L      AST (SGOT) 23 U/L      Alkaline Phosphatase 140 U/L      Total Bilirubin 0.2 mg/dL      eGFR Non African Amer 50 mL/min/1.73      Globulin 3.0 gm/dL      A/G Ratio 1.4 g/dL      BUN/Creatinine Ratio 15.6     Anion Gap 14.0 mmol/L     Narrative:       GFR Normal >60  Chronic Kidney Disease <60  Kidney Failure <15      BNP [[id number]]  (Abnormal) Collected:  08/31/20 1729    Specimen:  Blood Updated:  08/31/20 1751     proBNP 5,447.0 pg/mL     Narrative:       Among patients with dyspnea, NT-proBNP is highly sensitive for the detection of acute congestive heart failure. In addition NT-proBNP of <300 pg/ml effectively rules out acute congestive heart failure with 99% negative predictive value.    Results may be falsely decreased if patient taking Biotin.      aPTT [[id number]]  (Normal) Collected:  08/31/20 1729    Specimen:  Blood Updated:  08/31/20 1744     PTT 24.5 seconds     Protime-INR [[id number]]  (Normal) Collected:  08/31/20 1729    Specimen:  Blood Updated:  08/31/20 1744     Protime 12.7 Seconds      INR 0.99    CBC Auto Differential [[id number]]  (Abnormal) Collected:  08/31/20 1729    Specimen:  Blood Updated:  08/31/20 1736     WBC 12.38 10*3/mm3      RBC 4.46 10*6/mm3      Hemoglobin 12.8 g/dL      Hematocrit 39.4 %      MCV 88.3 fL      MCH 28.7 pg      MCHC 32.5 g/dL      RDW 14.1 %      RDW-SD 45.2 fl      MPV 11.1 fL      Platelets 168 10*3/mm3      Neutrophil % 73.9 %      Lymphocyte % 14.8 %      Monocyte  % 7.9 %      Eosinophil % 2.2 %      Basophil % 0.5 %      Immature Grans % 0.7 %      Neutrophils, Absolute 9.15 10*3/mm3      Lymphocytes, Absolute 1.83 10*3/mm3      Monocytes, Absolute 0.98 10*3/mm3      Eosinophils, Absolute 0.27 10*3/mm3      Basophils, Absolute 0.06 10*3/mm3      Immature Grans, Absolute 0.09 10*3/mm3      nRBC 0.0 /100 WBC         Cultures:    No results found for: BLOODCX, URINECX, WOUNDCX, MRSACX, RESPCX, STOOLCX    Radiology Review:  Imaging Results (Last 24 Hours)     ** No results found for the last 24 hours. **                   ASSESSMENT:      Pulmonary embolus (CMS/HCC)    Sinus tachycardia    Adenocarcinoma, lung (CMS/HCC)    She is remained hemodynamically stable overnight.  She did have elevated BNP and troponin levels.    PLAN:    1.  Continue anticoagulation with Lovenox  2.  Check echo and venous Dopplers today  3.  MRI of the brain to rule out CNS metastases in the event that she would need catheter directed thrombolysis  4.  Hold ARB with relatively low blood pressure  5.  Discussed continuing Lovenox at discharge given venous thromboembolism in setting of malignancy.  Will defer long-term anticoagulation with Lovenox versus direct oral anticoagulant to her oncologist in [place]      Toño Sheldon MD  09/01/20  07:53        Part of this note may be an electronic transcription/translation of spoken language to printed text using the Dragon Dictation System.

## 2020-09-02 VITALS
HEART RATE: 78 BPM | TEMPERATURE: 98.2 F | WEIGHT: 159.2 LBS | DIASTOLIC BLOOD PRESSURE: 72 MMHG | BODY MASS INDEX: 26.52 KG/M2 | HEIGHT: 65 IN | RESPIRATION RATE: 18 BRPM | SYSTOLIC BLOOD PRESSURE: 131 MMHG | OXYGEN SATURATION: 100 %

## 2020-09-02 PROCEDURE — 25010000002 ENOXAPARIN PER 10 MG: Performed by: INTERNAL MEDICINE

## 2020-09-02 RX ADMIN — SERTRALINE 100 MG: 100 TABLET, FILM COATED ORAL at 08:12

## 2020-09-02 RX ADMIN — ENOXAPARIN SODIUM 80 MG: 80 INJECTION SUBCUTANEOUS at 06:25

## 2020-09-02 RX ADMIN — CETIRIZINE HYDROCHLORIDE 10 MG: 10 TABLET, FILM COATED ORAL at 08:12

## 2020-09-02 RX ADMIN — LEVOTHYROXINE SODIUM 88 MCG: 88 TABLET ORAL at 06:25

## 2020-09-02 RX ADMIN — ROSUVASTATIN CALCIUM 10 MG: 10 TABLET, FILM COATED ORAL at 08:12

## 2020-09-02 NOTE — DISCHARGE SUMMARY
DISCHARGE SUMMARY       Date of Admission: 8/31/2020  Date of Discharge:  9/2/2020  Primary Care Physician: Toño Sheldon MD    Discharge Diagnoses:    Pulmonary embolus (CMS/HCC)    Sinus tachycardia    Adenocarcinoma, lung (CMS/HCC)        Presenting Problem/History of Present Illness  Pulmonary embolus (CMS/HCC) [I26.99]       Hospital Course  Patient made to the hospital pulmonary embolism.  She had recent diagnosis of non-small cell lung cancer.  Outpatient CT angiogram showed moderate to large clot burden and some evidence of right ventricular strain.  She is admitted to CCU.  She had a mild troponin elevation and elevation in BNP level findings consistent with submassive PE.  She remained hemodynamically stable.  She was seen by cardiology for consideration of catheter directed thrombolysis.  As she had remained hemodynamically stable and due to bleeding concerns given her recent diagnosis of malignancy we did not proceed with catheter echo thrombolysis.  She underwent MRI of her brain to rule out CNS metastases which was negative.  Lower extremity venous Doppler study was negative for any persistent clot in either leg.  Echocardiogram showed normal left regular ejection fraction but some evidence of pulmonary hypertension and right ventricular dysfunction related to PE.  Patient has remained hemodynamically stable.  She has been anticoagulate with Lovenox.  She is been ambulating within the room and notes improvement symptomatically.  We will going to gradually increase her activity level today and likely discharge home this evening.  We plan to remain on Lovenox due to venous thromboembolism in the setting of malignancy.  Consideration could be given to transition to Eliquis but will defer this to her oncologist.    Procedures Performed         Consults:   Consults     Date and Time Order Name Status Description    8/31/2020 1718 Inpatient Cardiology Consult Completed           Pertinent Test  Results:  Lab Results (most recent)     Procedure Component Value Units Date/Time    Basic Metabolic Panel [[id number]]  (Abnormal) Collected:  09/01/20 0254    Specimen:  Blood Updated:  09/01/20 0326     Glucose 104 mg/dL      BUN 17 mg/dL      Creatinine 1.01 mg/dL      Sodium 137 mmol/L      Potassium 4.2 mmol/L      Chloride 103 mmol/L      CO2 22.0 mmol/L      Calcium 9.2 mg/dL      eGFR Non African Amer 55 mL/min/1.73      BUN/Creatinine Ratio 16.8     Anion Gap 12.0 mmol/L     Narrative:       GFR Normal >60  Chronic Kidney Disease <60  Kidney Failure <15      CBC & Differential [[id number]] Collected:  09/01/20 0254    Specimen:  Blood Updated:  09/01/20 0311    Narrative:       The following orders were created for panel order CBC & Differential.  Procedure                               Abnormality         Status                     ---------                               -----------         ------                     CBC Auto Differential[[id number]]        Abnormal            Final result                 Please view results for these tests on the individual orders.    CBC Auto Differential [[id number]]  (Abnormal) Collected:  09/01/20 0254    Specimen:  Blood Updated:  09/01/20 0311     WBC 9.48 10*3/mm3      RBC 4.18 10*6/mm3      Hemoglobin 12.0 g/dL      Hematocrit 36.7 %      MCV 87.8 fL      MCH 28.7 pg      MCHC 32.7 g/dL      RDW 14.2 %      RDW-SD 45.1 fl      MPV 11.7 fL      Platelets 164 10*3/mm3      Neutrophil % 63.3 %      Lymphocyte % 24.3 %      Monocyte % 7.3 %      Eosinophil % 3.9 %      Basophil % 0.5 %      Immature Grans % 0.7 %      Neutrophils, Absolute 6.00 10*3/mm3      Lymphocytes, Absolute 2.30 10*3/mm3      Monocytes, Absolute 0.69 10*3/mm3      Eosinophils, Absolute 0.37 10*3/mm3      Basophils, Absolute 0.05 10*3/mm3      Immature Grans, Absolute 0.07 10*3/mm3      nRBC 0.0 /100 WBC     COVID-19,[facility] IN-HOUSE,Nasal Swab No Transport Media 3-4 HR TAT - Swab, Nasal Cavity  [[id number]]  (Normal) Collected:  08/31/20 1827    Specimen:  Swab from Nasal Cavity Updated:  08/31/20 1928     COVID19 Not Detected    Narrative:       Fact sheet for providers: https://www.fda.gov/media/342254/download     Fact sheet for patients: https://www.fda.gov/media/477513/download    Troponin [[id number]]  (Abnormal) Collected:  08/31/20 1729    Specimen:  Blood Updated:  08/31/20 1757     Troponin T 0.044 ng/mL     Narrative:       Troponin T Reference Range:  <= 0.03 ng/mL-   Negative for AMI  >0.03 ng/mL-     Abnormal for myocardial necrosis.  Clinicians would have to utilize clinical acumen, EKG, Troponin and serial changes to determine if it is an Acute Myocardial Infarction or myocardial injury due to an underlying chronic condition.       Results may be falsely decreased if patient taking Biotin.      Comprehensive Metabolic Panel [[id number]]  (Abnormal) Collected:  08/31/20 1729    Specimen:  Blood Updated:  08/31/20 1754     Glucose 99 mg/dL      BUN 17 mg/dL      Creatinine 1.09 mg/dL      Sodium 140 mmol/L      Potassium 4.1 mmol/L      Chloride 102 mmol/L      CO2 24.0 mmol/L      Calcium 9.6 mg/dL      Total Protein 7.2 g/dL      Albumin 4.20 g/dL      ALT (SGPT) 19 U/L      AST (SGOT) 23 U/L      Alkaline Phosphatase 140 U/L      Total Bilirubin 0.2 mg/dL      eGFR Non African Amer 50 mL/min/1.73      Globulin 3.0 gm/dL      A/G Ratio 1.4 g/dL      BUN/Creatinine Ratio 15.6     Anion Gap 14.0 mmol/L     Narrative:       GFR Normal >60  Chronic Kidney Disease <60  Kidney Failure <15      BNP [[id number]]  (Abnormal) Collected:  08/31/20 1729    Specimen:  Blood Updated:  08/31/20 1751     proBNP 5,447.0 pg/mL     Narrative:       Among patients with dyspnea, NT-proBNP is highly sensitive for the detection of acute congestive heart failure. In addition NT-proBNP of <300 pg/ml effectively rules out acute congestive heart failure with 99% negative predictive value.    Results may be falsely  decreased if patient taking Biotin.      aPTT [[id number]]  (Normal) Collected:  08/31/20 1729    Specimen:  Blood Updated:  08/31/20 1744     PTT 24.5 seconds     Protime-INR [[id number]]  (Normal) Collected:  08/31/20 1729    Specimen:  Blood Updated:  08/31/20 1744     Protime 12.7 Seconds      INR 0.99    CBC Auto Differential [[id number]]  (Abnormal) Collected:  08/31/20 1729    Specimen:  Blood Updated:  08/31/20 1736     WBC 12.38 10*3/mm3      RBC 4.46 10*6/mm3      Hemoglobin 12.8 g/dL      Hematocrit 39.4 %      MCV 88.3 fL      MCH 28.7 pg      MCHC 32.5 g/dL      RDW 14.1 %      RDW-SD 45.2 fl      MPV 11.1 fL      Platelets 168 10*3/mm3      Neutrophil % 73.9 %      Lymphocyte % 14.8 %      Monocyte % 7.9 %      Eosinophil % 2.2 %      Basophil % 0.5 %      Immature Grans % 0.7 %      Neutrophils, Absolute 9.15 10*3/mm3      Lymphocytes, Absolute 1.83 10*3/mm3      Monocytes, Absolute 0.98 10*3/mm3      Eosinophils, Absolute 0.27 10*3/mm3      Basophils, Absolute 0.06 10*3/mm3      Immature Grans, Absolute 0.09 10*3/mm3      nRBC 0.0 /100 WBC           Condition on Discharge: Stable and improved    Discharge Disposition  Home or Self Care    Discharge Medications     Discharge Medications      New Medications      Instructions Start Date   enoxaparin 80 MG/0.8ML solution syringe  Commonly known as:  LOVENOX   80 mg, Subcutaneous, Every 12 Hours Scheduled         Continue These Medications      Instructions Start Date   albuterol sulfate  (90 Base) MCG/ACT inhaler  Commonly known as:  PROVENTIL HFA;VENTOLIN HFA;PROAIR HFA   2 puffs, Inhalation, Every 4 Hours PRN      calcium carbonate 600 MG tablet  Commonly known as:  OS-IAN   600 mg, Oral, 2 Times Daily With Meals      cetirizine 10 MG tablet  Commonly known as:  zyrTEC   10 mg, Oral, Daily      coenzyme Q10 100 MG capsule   200 mg, Oral, Daily      Colostrum 500 MG capsule   500 mg, Oral, Daily      fish oil 1000 MG capsule capsule   1,000 mg,  Oral, Daily With Breakfast      Flaxseed Oil Max Str 1300 MG capsule   1,300 mg, Oral, Daily      levothyroxine 88 MCG tablet  Commonly known as:  SYNTHROID, LEVOTHROID   88 mcg, Oral, Daily      MULTIVITAMIN ADULT PO   1 tablet, Oral, Daily      rosuvastatin 10 MG tablet  Commonly known as:  CRESTOR   10 mg, Oral, Daily      sertraline 100 MG tablet  Commonly known as:  ZOLOFT   100 mg, Oral, Daily      traZODone 150 MG tablet  Commonly known as:  DESYREL   37.5-50 mg, Oral, Nightly      vitamin C 250 MG tablet  Commonly known as:  ASCORBIC ACID   500 mg, Oral, Daily         Stop These Medications    irbesartan 150 MG tablet  Commonly known as:  AVAPRO            Discharge Diet:   Diet Instructions     Diet: Regular      Discharge Diet:  Regular          Discharge Care Plan / Instructions:    Activity at Discharge:   Activity Instructions     Activity as Tolerated            Follow-up Appointments  No future appointments.  Additional Instructions for the Follow-ups that You Need to Schedule     Discharge Follow-up with PCP   As directed       Currently Documented PCP:    Toño Sheldon MD    PCP Phone Number:    [phone]     Follow Up Details:  1 week               Test Results Pending at Discharge       Toño Sheldon MD  09/02/20  08:02        Part of this note may be an electronic transcription/translation of spoken language to printed text using the Dragon Dictation System.

## 2020-09-02 NOTE — PROGRESS NOTES
Chief Complaint: Shortness of breath      Interval History:     She states with walking in the room she feels notably less short of breath.  Not having chest pain.  No bleeding complications.    Review of Systems:   General ROS: negative for - chills or fever  Gastrointestinal ROS: negative for - abdominal pain, diarrhea or nausea/vomiting       Vital Signs  Temp:  [98.1 °F (36.7 °C)-99.3 °F (37.4 °C)] 98.2 °F (36.8 °C)  Heart Rate:  [] 78  Resp:  [14-22] 18  BP: ()/(53-99) 131/72    Intake/Output Summary (Last 24 hours) at 9/2/2020 0758  Last data filed at 9/2/2020 0024  Gross per 24 hour   Intake 950 ml   Output 1350 ml   Net -400 ml       Physical Exam:     General Appearance:    Alert, cooperative, in no acute distress   Head:    N/A   Throat:   N/A   Neck:   N/A   Lungs:     Clear to auscultation,respirations regular, even and                  unlabored    Heart:    Regular rhythm and normal rate, normal S1 and S2, no            murmur, no gallop, no rub   Abdomen:     Normal bowel sounds, no masses, no organomegaly, soft        non-tender, non-distended, no guarding, no rebound                tenderness   Extremities:   No edema, no cyanosis, no clubbing   Pulses:   N/A   Skin:   N/A   Lymph nodes:   N/A   Neurologic:   N/A          Lab Review:       Lab Results (last 24 hours)     ** No results found for the last 24 hours. **        Cultures:    No results found for: BLOODCX, URINECX, WOUNDCX, MRSACX, RESPCX, STOOLCX    Radiology Review:  Imaging Results (Last 24 Hours)     Procedure Component Value Units Date/Time    US Venous Doppler Lower Extremity Bilateral (duplex) [[id number]] Collected:  09/01/20 1523     Updated:  09/01/20 1526    Narrative:       History: PE       Impression:       Impression: There is no evidence of deep venous thrombosis or  superficial thrombophlebitis of right or left lower extremities.     Comments: Bilateral lower extremity venous duplex exam was performed  using  color Doppler flow, Doppler waveform analysis, and grayscale  imaging, with and without compression. There is no evidence of deep  venous thrombosis in the common femoral, superficial femoral, popliteal,  peroneal, anterior tibial, and posterior tibial veins bilaterally. No  thrombus is identified in the saphenofemoral junctions and greater  saphenous veins bilaterally.         This report was finalized on 09/01/2020 15:23 by Dr. True Duron MD.    MRI Brain With & Without Contrast [[id number]] Collected:  09/01/20 0846     Updated:  09/01/20 0856    Narrative:       EXAMINATION: MRI BRAIN W WO CONTRAST- 9/1/2020 8:46 AM CDT     HISTORY: Headache, new lung cancer diagnosis     COMPARISON: None     Technical: Multiplanar, multisequence imaging was performed through the  brain before and after the administration of IV contrast.     FINDINGS:  There is no diffusion restriction to suggest acute ischemia.     There is normal-appearing anatomy at the craniocervical junction.     There is no evidence of acute intracranial hemorrhage or midline shift.     The ventricles appear normal in configuration. Normal signal void is  seen in the visualized carotid and basilar arteries.     The visualized globes orbits are unremarkable. The paranasal sinuses and  mastoid air cells appear clear.     There is a single focus of abnormal FLAIR signal in the subcortical  white matter of the left frontal lobe. This is not more than expected  for the patient's age.     On postcontrast imaging, no abnormal enhancement is identified.       Impression:       Normal MRI brain with and without contrast for age. No  evidence of metastatic disease.  This report was finalized on 09/01/2020 08:53 by Dr. Giuliano Schwab MD.                   ASSESSMENT:      Pulmonary embolus (CMS/HCC)    Sinus tachycardia    Adenocarcinoma, lung (CMS/HCC)    She is symptomatically improving.  Hemodynamically she is stable.  We are going to plan to continue on  Lovenox until she has her oncology follow-up.  We will have her ambulate a little bit further today with monitoring and if doing okay may discharge home on Lovenox this evening.    PLAN:    1.  Continue Lovenox  2.  Increase activity level  3.  Possible discharge later today      Toño Sheldon MD  09/02/20  07:58        Part of this note may be an electronic transcription/translation of spoken language to printed text using the Dragon Dictation System.

## 2020-09-02 NOTE — PLAN OF CARE
Problem: Patient Care Overview  Goal: Plan of Care Review  Outcome: Ongoing (interventions implemented as appropriate)  Note:   Transfer from ccu. Pt Admitted with Pulmonary Emboli. Was having increased SOA at home. Recent dx lung cancer. Negative Covid testing x3. MRI brain neg. Venous scan LE's negative. 2D echo done EF greater then 70% Right mod. Dilated. Nml function. On Lovenox. Bedrest with BR priv. S/ST

## 2020-09-03 ENCOUNTER — READMISSION MANAGEMENT (OUTPATIENT)
Dept: CALL CENTER | Facility: HOSPITAL | Age: 65
End: 2020-09-03

## 2020-09-03 NOTE — OUTREACH NOTE
Prep Survey      Responses   [facility] discharged from?  [facility]   Is LACE score < 7 ?  No   Eligibility  Readm Mgmt   Discharge diagnosis    Pulmonary embolus    Does the patient have one of the following disease processes/diagnoses(primary or secondary)?  Other   Does the patient have Home health ordered?  No   Is there a DME ordered?  No   Medication alerts for this patient  Lovenox    Prep survey completed?  Yes          Lakisha Lagunas RN

## 2020-09-05 ENCOUNTER — READMISSION MANAGEMENT (OUTPATIENT)
Dept: CALL CENTER | Facility: HOSPITAL | Age: 65
End: 2020-09-05

## 2020-09-05 NOTE — OUTREACH NOTE
Medical Week 1 Survey      Responses   [facility] patient discharged from?  [place]   COVID-19 Test Status  Negative   Does the patient have one of the following disease processes/diagnoses(primary or secondary)?  Other   Is there a successful TCM telephone encounter documented?  No   Week 1 attempt successful?  Yes   Call start time  1452   Call end time  1500   Discharge diagnosis    Pulmonary embolus    Meds reviewed with patient/caregiver?  Yes   Is the patient having any side effects they believe may be caused by any medication additions or changes?  No   Does the patient have all medications ordered at discharge?  Yes   Is the patient taking all medications as directed (includes completed medication regime)?  Yes   Does the patient have a primary care provider?   Yes   Does the patient have an appointment with their PCP within 7 days of discharge?  Yes   Has the patient kept scheduled appointments due by today?  N/A   Has home health visited the patient within 72 hours of discharge?  N/A   Pulse Ox monitoring  None   Psychosocial issues?  No   Did the patient receive a copy of their discharge instructions?  Yes   Nursing interventions  Reviewed instructions with patient   What is the patient's perception of their health status since discharge?  Improving   Is the patient/caregiver able to teach back signs and symptoms related to disease process for when to call PCP?  Yes   Is the patient/caregiver able to teach back signs and symptoms related to disease process for when to call 911?  Yes   Is the patient/caregiver able to teach back the hierarchy of who to call/visit for symptoms/problems? PCP, Specialist, Home health nurse, Urgent Care, ED, 911  Yes   Additional teach back comments  denies pain, has mild SOB on exertion occasionally   Week 1 call completed?  Yes          Codi Langley RN

## 2020-09-15 ENCOUNTER — READMISSION MANAGEMENT (OUTPATIENT)
Dept: CALL CENTER | Facility: HOSPITAL | Age: 65
End: 2020-09-15

## 2020-09-15 NOTE — OUTREACH NOTE
Medical Week 2 Survey      Responses   [facility] patient discharged from?  [place]   COVID-19 Test Status  Negative   Does the patient have one of the following disease processes/diagnoses(primary or secondary)?  Other   Week 2 attempt successful?  No   Unsuccessful attempts  Attempt 1          Elizabeth Cha RN

## 2020-12-08 ENCOUNTER — TRANSCRIBE ORDERS (OUTPATIENT)
Dept: ADMINISTRATIVE | Facility: HOSPITAL | Age: 65
End: 2020-12-08

## 2020-12-08 DIAGNOSIS — C34.90 MALIGNANT NEOPLASM OF UNSPECIFIED PART OF UNSPECIFIED BRONCHUS OR LUNG (HCC): Primary | ICD-10-CM

## 2020-12-14 ENCOUNTER — HOSPITAL ENCOUNTER (OUTPATIENT)
Dept: CT IMAGING | Facility: HOSPITAL | Age: 65
Discharge: HOME | End: 2020-12-14
Admitting: INTERNAL MEDICINE

## 2020-12-14 DIAGNOSIS — C34.90 MALIGNANT NEOPLASM OF UNSPECIFIED PART OF UNSPECIFIED BRONCHUS OR LUNG (HCC): ICD-10-CM

## 2020-12-14 LAB — CREAT BLDA-MCNC: 1.1 MG/DL (ref 0.6–1.3)

## 2020-12-14 PROCEDURE — 82565 ASSAY OF CREATININE: CPT

## 2020-12-14 PROCEDURE — 25010000002 IOPAMIDOL 61 % SOLUTION: Performed by: INTERNAL MEDICINE

## 2020-12-14 PROCEDURE — 71260 CT THORAX DX C+: CPT

## 2020-12-14 RX ADMIN — IOPAMIDOL 100 ML: 612 INJECTION, SOLUTION INTRAVENOUS at 13:14

## 2020-12-21 NOTE — PROGRESS NOTES
[facility] HEMATOLOGY AND ONCOLOGY  [address]  [place] [id number] [phone]    Patient Name: Juaan Erickson  Encounter Date: 12/22/2020  YOB: 1955  Patient Number: [id number]    Initial Note    REASON FOR CONSULTATION: [place] of care for known stage III adenocarcinoma of the lung.    HISTORY OF PRESENT ILLNESS: Juana Erickson is a 65-year-old female, medical patient of Dr. Sheldon whose history includes tobacco smoking, and stage IIIc non-small cell lung carcinoma, adenocarcinoma for which she has received 1 cycle of chemotherapy with carboplatin and pemetrexed on 11/2/2020 with concurrent chest radiation at [facility] after which she developed a subacute stroke.    Oncologic history:  --07/2020-initial presentation with shortness of breath.  Received antibiotics and prednisone with no improvement.    --07/23/2020.  Chest CT showed a 3 x 1.6 cm right perihilar mass confluent with mediastinal and hilar lymph nodes.  Right lower lobe infiltrate present.  Bilateral multiple PE with right heart strain.  Hospitalized outside hospital.    --08/10/2020-CT chest shows large calcified right paratracheal lymph nodes, other calcified mediastinal, right hilar, right supraclavicular and periaortic lymph nodes.  Right middle lobe nodule 2.7 cm in the left lower lobe 1.3 cm nodule.  Bibasilar atelectasis.    --8/12/2020: Bronchoscopy/biopsy right middle lobe-atypical cells suspicious for malignancy, right station 7. 12 lymph nodes adenocarcinoma.    --9/17/2020-PET/CT hypermetabolic right supraclavicular node.  Mediastinal and right hilar lymph nodes which is confluent with the right perihilar primary lung malignancy.  Periesophageal lymph node involvement.  Right middle lobe and right lower lobe lesions with minimal FDG avid 80.  No FDG avid disease in the left lung, abdomen or pelvis.    --10/2/2020-concurrent  chemoradiotherapy-first cycle of chemo with carbo and pemetrexed.  Complicated by dysphagia odynophagia sore throat, epistaxis.  Seen by ENT.  Had scope in edema and cricoid area was found treated with Medrol Dosepak, dysphagia improved.  Delay of second cycle of chemo (due 10/23/2020) due to above issues and then hospitalization.    --Hospitalization from 10/28/2020 to 11/1/2020 for stroke after complaints of headache and dizziness.  MRI found ischemic stroke, subacute in nature.  Work-up with echo did not show source of the emboli from the heart.  Patient on anticoagulation, neuro recommends MRI in 1 month, discharged on 11/1/2020.    --11/02/2020-clinic visit.  Signed consent to start chemo with carboplatin and paclitaxel.  Chemo scheduled for 11/3/2020    --11/20/2020-  most recent office encounter by Dr. Zbigniew Sharif, medical oncology at .  Patient apparently received a total of 3 cycles of chemotherapy (carboplatin and pemetrexed, C1, followed by carboplatin and Taxol weekly x2, most recently on 11/17/2020).  Given the patient's poor tolerance to chemo (throat swelling) they decided to forego the last cycle of chemotherapy.  Therefore the patient will not receive any more chemotherapy or radiation.  Explained that she would benefit from durvalumab maintenance if CT scan in 3 weeks or so does not show progression of disease.  Immunotherapy with durvalumab recommended.  Plan: No more chemo radiation.  CT scan in 3 to 4 weeks before durvalumab.  Recommend durvalumab if no disease progression.    -- 12/14/2020- Chest CT. The dominant nodule within the right middle lobe has decreased in size.  A 10 mm nodule within the right lower lobe is stable.  Indeterminate subpleural 8 mm nodule within the anterior right middle lobe appears to be slightly enlarged.    LABS    Lab Results - Last 18 Months   Lab Units 09/01/20  0254 08/31/20  1729   HEMOGLOBIN g/dL 12.0 12.8   HEMATOCRIT % 36.7 39.4   MCV fL 87.8 88.3   WBC  10*3/mm3 9.48 12.38*   RDW % 14.2 14.1   MPV fL 11.7 11.1   PLATELETS 10*3/mm3 164 168   IMM GRAN % % 0.7* 0.7*   NEUTROS ABS 10*3/mm3 6.00 9.15*   LYMPHS ABS 10*3/mm3 2.30 1.83   MONOS ABS 10*3/mm3 0.69 0.98*   EOS ABS 10*3/mm3 0.37 0.27   BASOS ABS 10*3/mm3 0.05 0.06   IMMATURE GRANS (ABS) 10*3/mm3 0.07* 0.09*   NRBC /100 WBC 0.0 0.0       Lab Results - Last 18 Months   Lab Units 12/14/20  1324 09/01/20  0254 08/31/20  1729 08/31/20  1543   GLUCOSE mg/dL  --  104* 99  --    SODIUM mmol/L  --  137 140  --    POTASSIUM mmol/L  --  4.2 4.1  --    CO2 mmol/L  --  22.0 24.0  --    CHLORIDE mmol/L  --  103 102  --    ANION GAP mmol/L  --  12.0 14.0  --    CREATININE mg/dL 1.10 1.01* 1.09* 1.20   BUN mg/dL  --  17 17  --    BUN / CREAT RATIO   --  16.8 15.6  --    CALCIUM mg/dL  --  9.2 9.6  --    EGFR IF NONAFRICN AM mL/min/1.73  --  55* 50*  --    ALK PHOS U/L  --   --  140*  --    TOTAL PROTEIN g/dL  --   --  7.2  --    ALT (SGPT) U/L  --   --  19  --    AST (SGOT) U/L  --   --  23  --    BILIRUBIN mg/dL  --   --  0.2  --    ALBUMIN g/dL  --   --  4.20  --    GLOBULIN gm/dL  --   --  3.0  --          PAST MEDICAL HISTORY:  ALLERGIES:  Allergies   Allergen Reactions   • Nsaids Other (See Comments)     Raises blood pressure.      CURRENT MEDICATIONS:  Outpatient Encounter Medications as of 12/22/2020   Medication Sig Dispense Refill   • albuterol sulfate  (90 Base) MCG/ACT inhaler Inhale 2 puffs Every 4 (Four) Hours As Needed for Shortness of Air.     • dexamethasone (DECADRON) 2 MG tablet Take 2 mg by mouth 2 (Two) Times a Day With Meals.     • guaiFENesin-codeine (Virtussin A/C) 100-10 MG/5ML liquid Take 5 mL by mouth 3 (Three) Times a Day As Needed for Cough.     • irbesartan (AVAPRO) 150 MG tablet Take 150 mg by mouth Every Night.     • levothyroxine (SYNTHROID, LEVOTHROID) 88 MCG tablet Take 88 mcg by mouth Daily.     • LORazepam (ATIVAN) 0.5 MG tablet Take 0.5 mg by mouth Every 8 (Eight) Hours As Needed for  Anxiety.     • omeprazole (priLOSEC) 20 MG capsule Take 20 mg by mouth Daily.     • oxyCODONE (Roxicodone) 5 MG immediate release tablet Take  by mouth.     • prochlorperazine (COMPAZINE) 10 MG tablet Take 10 mg by mouth Every 6 (Six) Hours As Needed for Nausea or Vomiting.     • promethazine (PHENERGAN) 6.25 MG/5ML syrup Take  by mouth 4 (Four) Times a Day As Needed for Nausea or Vomiting.     • rivaroxaban (XARELTO) 20 MG tablet Take 20 mg by mouth Daily.     • rosuvastatin (CRESTOR) 10 MG tablet Take 10 mg by mouth Daily.     • sertraline (ZOLOFT) 100 MG tablet Take 100 mg by mouth Daily.     • traZODone (DESYREL) 150 MG tablet Take 37.5-50 mg by mouth Every Night.     • calcium carbonate (OS-IAN) 600 MG tablet Take 600 mg by mouth 2 (Two) Times a Day With Meals.     • cetirizine (zyrTEC) 10 MG tablet Take 10 mg by mouth Daily.     • coenzyme Q10 100 MG capsule Take 200 mg by mouth Daily.     • Colostrum 500 MG capsule Take 500 mg by mouth Daily.     • Flaxseed, Linseed, (FLAXSEED OIL MAX STR) 1300 MG capsule Take 1,300 mg by mouth Daily.     • glucosamine-chondroitin 500-400 MG capsule capsule Take  by mouth 3 (Three) Times a Day With Meals.     • Multiple Vitamins-Minerals (MULTIVITAMIN ADULT PO) Take 1 tablet by mouth Daily.     • Omega-3 Fatty Acids (FISH OIL) 1000 MG capsule capsule Take 1,000 mg by mouth Daily With Breakfast.     • vitamin C (ASCORBIC ACID) 250 MG tablet Take 500 mg by mouth Daily.       No facility-administered encounter medications on file as of 12/22/2020.      ADULT ILLNESSES:  Patient Active Problem List   Diagnosis Code   • Pulmonary embolus (CMS/HCC) I26.99   • Sinus tachycardia R00.0   • Adenocarcinoma, lung (CMS/HCC) C34.90   • Chronic cough R05   • Stage 3 chronic kidney disease N18.30   • Anxiety F41.9   • Asthma J45.909   • Essential hypertension I10   • Hyperlipidemia E78.5   • Hypothyroidism E03.9   • Lung mass R91.8   • Obstructive sleep apnea syndrome G47.33   •  Osteoarthritis M19.90   • Osteopenia M85.80   • Overweight E66.3   • Solitary pulmonary nodule R91.1     SURGERIES:  Past Surgical History:   Procedure Laterality Date   • BREAST BIOPSY     • COLONOSCOPY  01/2010   • COLONOSCOPY  11/23/2016   • LUNG BIOPSY  08/12/2020     HEALTH MAINTENANCE ITEMS:  Health Maintenance Due   Topic Date Due   • COLONOSCOPY  1955   • ZOSTER VACCINE (1 of 2) 07/07/2005   • HEPATITIS C SCREENING  05/15/2017   • ANNUAL WELLNESS VISIT  05/15/2017   • MAMMOGRAM  05/15/2017   • PAP SMEAR  05/15/2017   • LIPID PANEL  05/15/2017   • Pneumococcal Vaccine 65+ (1 of 1 - PPSV23) 07/07/2020   • INFLUENZA VACCINE  08/01/2020       <no information>  Last Completed Colonoscopy       Status Date      COLONOSCOPY No completions recorded          There is no immunization history on file for this patient.  Last Completed Mammogram       Status Date      MAMMOGRAM No completions recorded            FAMILY HISTORY:  Family History   Problem Relation Age of Onset   • Parkinsonism Mother    • Aneurysm Father      SOCIAL HISTORY:  Social History     Socioeconomic History   • Marital status:      Spouse name: Not on file   • Number of children: Not on file   • Years of education: Not on file   • Highest education level: Not on file   Tobacco Use   • Smoking status: Former Smoker     Packs/day: 0.50   • Smokeless tobacco: Never Used   Substance and Sexual Activity   • Alcohol use: Yes     Comment: occasional    • Drug use: Never   • Sexual activity: Defer       REVIEW OF SYSTEMS:  Review of Systems   Constitutional: Positive for activity change (tires easier), appetite change (not as hungry) and fatigue (tires faster). Negative for chills, diaphoresis, fever, unexpected weight gain and unexpected weight loss.        Manages her ADLs, including chores, but has not been running errands nor driving.  Is up and about 75%   HENT: Positive for rhinorrhea, sinus pressure, sore throat, swollen glands  "(currently on tapering decadron for the past several weeks), trouble swallowing (odynophagia.  improved on steroids) and voice change (hoarseness).    Eyes: Negative.    Respiratory: Positive for shortness of breath (exertional dyspnea but no sob with routine activities). Negative for cough.         No tobacco use   Cardiovascular: Negative.    Gastrointestinal: Negative.  Negative for constipation, diarrhea, nausea and vomiting.   Endocrine: Negative.    Genitourinary: Negative.    Musculoskeletal: Positive for arthralgias (hands, feet, back, \"arthritis\"), back pain and joint swelling (ankles). Negative for myalgias (leg cramps at night in the past).   Skin: Negative.    Neurological: Positive for tremors, numbness (hands) and memory problem (forgetfulness since the stroke).   Hematological: Negative.    Psychiatric/Behavioral: Positive for depressed mood (situational). The patient is nervous/anxious.        /84   Pulse 99   Temp 97.5 °F (36.4 °C)   Resp 16   Ht 165.1 cm (65\")   Wt 74.2 kg (163 lb 8 oz)   LMP  (LMP Unknown)   SpO2 97%   Breastfeeding No   BMI 27.21 kg/m²  Body surface area is 1.82 meters squared.  Pain Score    12/22/20 1457   PainSc: 0-No pain       Physical Exam:  Physical Exam  Vitals signs reviewed.   Constitutional:       Appearance: Normal appearance.      Comments: Pleasant, cooperative, heavy-set, well kept female, ambulatory, ECOG 1.  She is here with her female partner/spouse, Sera GRANTT:      Head: Normocephalic and atraumatic.      Mouth/Throat:      Mouth: Mucous membranes are moist.      Pharynx: No oropharyngeal exudate.      Comments: Wearing a surgical mask today  Eyes:      General: No scleral icterus.     Extraocular Movements: Extraocular movements intact.      Pupils: Pupils are equal, round, and reactive to light.   Neck:      Musculoskeletal: Normal range of motion and neck supple.   Cardiovascular:      Rate and Rhythm: Normal rate and regular rhythm. "   Pulmonary:      Effort: Pulmonary effort is normal.   Abdominal:      General: Abdomen is flat.      Palpations: Abdomen is soft.      Tenderness: There is no abdominal tenderness. There is no guarding.   Musculoskeletal: Normal range of motion.         General: No swelling.      Right lower leg: No edema.      Left lower leg: No edema.   Lymphadenopathy:      Cervical: No cervical adenopathy.   Skin:     General: Skin is warm.      Coloration: Skin is not jaundiced or pale.      Findings: No bruising, erythema, lesion or rash.   Neurological:      General: No focal deficit present.      Mental Status: She is alert and oriented to person, place, and time.      Cranial Nerves: No cranial nerve deficit.   Psychiatric:         Mood and Affect: Mood normal.         Behavior: Behavior normal.         Thought Content: Thought content normal.         ASSESSMENT:   1.  Lung adenocarcinoma.                Stage: IIIC (cT4, cN3, M0)         Tumor burden: 3 x 1.6 cm right perihilar mass confluent with mediastinal and hilar lymphadenopathy (chest CT 7/23/2020).  Path with hypermetabolic right supraclavicular node, mediastinal and hilar lymph nodes confluent with right perihilar primary lung malignancy.  Periesophageal lymph node involvement.  Right middle lobe and right lower lobe lesions with minimal FDG avidity.          PD-L1-40% (+)         Complications of tumor:                Tumor status:   --Concurrent chemoradiation therapy, first cycle of carboplatin pemetrexed, 10/2/2020.  Subsequent chemo delayed (due 10/23/2020) due to hospitalization for stroke.  --C2, 10/27/2020 and C3, 11/17/2020 (carboplatin/taxol)  --As of 11/02/2020 had received 17 fractions of radiation - completed 30 fx 11/18/2020    2.  Ischemic stroke  3.  Extensive bilateral pulmonary embolism  4.  Post chemotherapy associated throat/submandibular swelling.  Remains on tapering Decadron.            RECOMMENDATIONS:   1. Apprised of the available  diagnostic information.  Note the previous imaging studies, bronchoscopic findings (including pathology), dates of chemotherapy administration (initially complicated by stroke and hospitalization as noted above) subsequently delaying therapy.  Review most recent office encounter by Dr. Zbigniew Loya, medical oncology at  on 11/20/2020.  Patient received a total of 3 cycles of chemotherapy (carboplatin and pemetrexed, C1, followed by carboplatin and Taxol weekly x2, most recently 11/17/2020).  Given the patient's poor tolerance to chemo (throat swelling) they decided to forego the last cycle of chemotherapy.  Therefore the patient will not receive anymore chemotherapy or radiation.  Explained that she would benefit from durvalumab maintenance if CT scan in 3 weeks or so does not show progression of disease.  Immunotherapy with durvalumab recommended.  Plan: No more chemo radiation.  CT scan in 3 to 4 weeks before durvalumab.  Recommend durvalumab if no disease progression.  2.  Apprised of chest CT, 12/14/2020 (above) showing decrease in dominant nodule within the right middle lobe and stable right lower lobe nodule.  3.  Draw baseline CBC with differential, CMP, iron, iron saturation, ferritin, CEA.  4.  Previously reviewed NCCN guidelines version 6.2020 non-small cell lung cancer, stage III (unresectable).  Chemotherapy regimens used.  Radiation therapy (concurrent regimens usually radiation therapy): Paclitaxel 45 to 50 mg/m² weekly; carboplatin AUC, concurrent thoracic RT +/- additional 2 cycles every 21 days of paclitaxel 200 mg/m² and carboplatin AUC 6-followed by consolidation therapy for unresectable stage III NSCLC, PS 0-1 and no disease progression after 2 or more cycles of definitive chemoradiation: Durvalumab 10 mg/kg IV every 2 weeks for up to 12 months (category 1).    5.  Teaching sheets for durvalumab    6.  Discussed the potential toxicities of durvalumab to include but not limited to (immune  mediated reaction, pneumonitis interstitial lung disease, hepatitis, colitis, severe diarrhea, hypothyroidism, hyperthyroidism, thyroiditis, type 1 diabetes, hypopituitarism, thrombocytopenic purpura, nephritis, aseptic meningitis, hemolytic anemia, myocarditis, severe infection, uveitis, infusion reaction, electrolyte abnormalities, lymphopenia, anemia, skin reaction, fatigue, upper respiratory infection, musculoskeletal pain, constipation, decreased appetite, nausea, edema, urinary tract infection, abdominal pain, pyrexia, dyspnea, rash, cough). Questions answered.  She agrees to a trial therapy     7.  Schedule C1 on 12/28/2020; C2 on 01/11/2021; C3 on 01/25/2021; C4 on 02/08/2021- durvalumab (plan: every 2 weeks x 24 cycles - 12 months, progression or toxicity) per administration guidelines - at [facility]                 durvalumab 10 mg/kg (WT = 74 kg; TD = 740 mg)      11.  Premeds:              Decadron 10 mg IV             Benadryl 25 mg IV             Pepcid 10 mg IV      8.  TSH, FT4, CMP, Mg++ and CBC with differential weekly with Procrit 40,000 units subcutaneous every 2 weeks if Hgb less than 10 and Hct less than 30; Neupogen 480 mcg subcutaneously daily x3 if ANC less than 1 - P   9.   Return to office in 5 weeks with pre-office CEA, CBC and differential, CMP.      I spent 62 total minutes, face-to-face, caring for Juana harris.  Greater than 50% of this time involved counseling and/or coordination of care as documented within this note regarding the patient's illness(es), pros and cons of various treatment options, instructions and/or risk reduction.

## 2020-12-22 ENCOUNTER — LAB (OUTPATIENT)
Dept: LAB | Facility: HOSPITAL | Age: 65
End: 2020-12-22

## 2020-12-22 ENCOUNTER — APPOINTMENT (OUTPATIENT)
Dept: LAB | Facility: HOSPITAL | Age: 65
End: 2020-12-22

## 2020-12-22 ENCOUNTER — CONSULT (OUTPATIENT)
Dept: ONCOLOGY | Facility: CLINIC | Age: 65
End: 2020-12-22

## 2020-12-22 VITALS
HEIGHT: 65 IN | BODY MASS INDEX: 27.24 KG/M2 | TEMPERATURE: 97.5 F | OXYGEN SATURATION: 97 % | DIASTOLIC BLOOD PRESSURE: 84 MMHG | RESPIRATION RATE: 16 BRPM | HEART RATE: 99 BPM | WEIGHT: 163.5 LBS | SYSTOLIC BLOOD PRESSURE: 152 MMHG

## 2020-12-22 DIAGNOSIS — C34.90 ADENOCARCINOMA OF LUNG, UNSPECIFIED LATERALITY (HCC): ICD-10-CM

## 2020-12-22 DIAGNOSIS — R94.6 ABNORMAL RESULTS OF THYROID FUNCTION STUDIES: ICD-10-CM

## 2020-12-22 DIAGNOSIS — C34.90 ADENOCARCINOMA OF LUNG, UNSPECIFIED LATERALITY (HCC): Primary | ICD-10-CM

## 2020-12-22 PROBLEM — R05.3 CHRONIC COUGH: Status: ACTIVE | Noted: 2020-12-22

## 2020-12-22 PROBLEM — I10 ESSENTIAL HYPERTENSION: Status: ACTIVE | Noted: 2020-12-22

## 2020-12-22 PROBLEM — E78.5 HYPERLIPIDEMIA: Status: ACTIVE | Noted: 2020-12-22

## 2020-12-22 PROBLEM — R91.1 SOLITARY PULMONARY NODULE: Status: ACTIVE | Noted: 2020-07-21

## 2020-12-22 PROBLEM — R91.8 LUNG MASS: Status: ACTIVE | Noted: 2020-07-24

## 2020-12-22 PROBLEM — E66.3 OVERWEIGHT: Status: ACTIVE | Noted: 2020-12-22

## 2020-12-22 PROBLEM — F41.9 ANXIETY: Status: ACTIVE | Noted: 2020-12-22

## 2020-12-22 PROBLEM — N18.30 STAGE 3 CHRONIC KIDNEY DISEASE (HCC): Status: ACTIVE | Noted: 2020-12-22

## 2020-12-22 PROBLEM — M19.90 OSTEOARTHRITIS: Status: ACTIVE | Noted: 2020-12-22

## 2020-12-22 PROBLEM — M85.80 OSTEOPENIA: Status: ACTIVE | Noted: 2017-06-20

## 2020-12-22 PROBLEM — E03.9 HYPOTHYROIDISM: Status: ACTIVE | Noted: 2020-12-22

## 2020-12-22 PROBLEM — G47.33 OBSTRUCTIVE SLEEP APNEA SYNDROME: Status: ACTIVE | Noted: 2017-10-09

## 2020-12-22 LAB
ALBUMIN SERPL-MCNC: 4.4 G/DL (ref 3.5–5.2)
ALBUMIN/GLOB SERPL: 1.5 G/DL
ALP SERPL-CCNC: 100 U/L (ref 39–117)
ALT SERPL W P-5'-P-CCNC: 28 U/L (ref 1–33)
ANION GAP SERPL CALCULATED.3IONS-SCNC: 8 MMOL/L (ref 5–15)
AST SERPL-CCNC: 26 U/L (ref 1–32)
BASOPHILS # BLD AUTO: 0.03 10*3/MM3 (ref 0–0.2)
BASOPHILS NFR BLD AUTO: 0.4 % (ref 0–1.5)
BILIRUB SERPL-MCNC: <0.2 MG/DL (ref 0–1.2)
BUN SERPL-MCNC: 25 MG/DL (ref 8–23)
BUN/CREAT SERPL: 20.2 (ref 7–25)
CALCIUM SPEC-SCNC: 9.4 MG/DL (ref 8.6–10.5)
CHLORIDE SERPL-SCNC: 105 MMOL/L (ref 98–107)
CO2 SERPL-SCNC: 26 MMOL/L (ref 22–29)
CREAT SERPL-MCNC: 1.24 MG/DL (ref 0.57–1)
DEPRECATED RDW RBC AUTO: 60 FL (ref 37–54)
EOSINOPHIL # BLD AUTO: 0 10*3/MM3 (ref 0–0.4)
EOSINOPHIL NFR BLD AUTO: 0 % (ref 0.3–6.2)
ERYTHROCYTE [DISTWIDTH] IN BLOOD BY AUTOMATED COUNT: 17.4 % (ref 12.3–15.4)
FERRITIN SERPL-MCNC: 246.8 NG/ML (ref 13–150)
GFR SERPL CREATININE-BSD FRML MDRD: 43 ML/MIN/1.73
GLOBULIN UR ELPH-MCNC: 2.9 GM/DL
GLUCOSE SERPL-MCNC: 146 MG/DL (ref 65–99)
HCT VFR BLD AUTO: 30.6 % (ref 34–46.6)
HGB BLD-MCNC: 10.7 G/DL (ref 12–15.9)
IMM GRANULOCYTES # BLD AUTO: 0.04 10*3/MM3 (ref 0–0.05)
IMM GRANULOCYTES NFR BLD AUTO: 0.6 % (ref 0–0.5)
IRON 24H UR-MRATE: 69 MCG/DL (ref 37–145)
IRON SATN MFR SERPL: 17 % (ref 20–50)
LYMPHOCYTES # BLD AUTO: 0.25 10*3/MM3 (ref 0.7–3.1)
LYMPHOCYTES NFR BLD AUTO: 3.5 % (ref 19.6–45.3)
MAGNESIUM SERPL-MCNC: 1.8 MG/DL (ref 1.6–2.4)
MCH RBC QN AUTO: 32.4 PG (ref 26.6–33)
MCHC RBC AUTO-ENTMCNC: 35 G/DL (ref 31.5–35.7)
MCV RBC AUTO: 92.7 FL (ref 79–97)
MONOCYTES # BLD AUTO: 0.32 10*3/MM3 (ref 0.1–0.9)
MONOCYTES NFR BLD AUTO: 4.5 % (ref 5–12)
NEUTROPHILS NFR BLD AUTO: 6.46 10*3/MM3 (ref 1.7–7)
NEUTROPHILS NFR BLD AUTO: 91 % (ref 42.7–76)
NRBC BLD AUTO-RTO: 0 /100 WBC (ref 0–0.2)
PLATELET # BLD AUTO: 196 10*3/MM3 (ref 140–450)
PMV BLD AUTO: 10.2 FL (ref 6–12)
POTASSIUM SERPL-SCNC: 4.5 MMOL/L (ref 3.5–5.2)
PROT SERPL-MCNC: 7.3 G/DL (ref 6–8.5)
RBC # BLD AUTO: 3.3 10*6/MM3 (ref 3.77–5.28)
SODIUM SERPL-SCNC: 139 MMOL/L (ref 136–145)
T4 FREE SERPL-MCNC: 1.36 NG/DL (ref 0.93–1.7)
TIBC SERPL-MCNC: 401 MCG/DL (ref 298–536)
TRANSFERRIN SERPL-MCNC: 269 MG/DL (ref 200–360)
TSH SERPL DL<=0.05 MIU/L-ACNC: 1.1 UIU/ML (ref 0.27–4.2)
WBC # BLD AUTO: 7.1 10*3/MM3 (ref 3.4–10.8)

## 2020-12-22 PROCEDURE — 84466 ASSAY OF TRANSFERRIN: CPT

## 2020-12-22 PROCEDURE — 36415 COLL VENOUS BLD VENIPUNCTURE: CPT

## 2020-12-22 PROCEDURE — 82378 CARCINOEMBRYONIC ANTIGEN: CPT

## 2020-12-22 PROCEDURE — 82728 ASSAY OF FERRITIN: CPT

## 2020-12-22 PROCEDURE — 99205 OFFICE O/P NEW HI 60 MIN: CPT | Performed by: INTERNAL MEDICINE

## 2020-12-22 PROCEDURE — 83540 ASSAY OF IRON: CPT

## 2020-12-22 PROCEDURE — 85025 COMPLETE CBC W/AUTO DIFF WBC: CPT

## 2020-12-22 PROCEDURE — 84439 ASSAY OF FREE THYROXINE: CPT

## 2020-12-22 PROCEDURE — 83735 ASSAY OF MAGNESIUM: CPT

## 2020-12-22 PROCEDURE — 80053 COMPREHEN METABOLIC PANEL: CPT

## 2020-12-22 PROCEDURE — 84443 ASSAY THYROID STIM HORMONE: CPT

## 2020-12-22 RX ORDER — OXYCODONE HYDROCHLORIDE 5 MG/1
TABLET ORAL
COMMUNITY
End: 2021-01-01

## 2020-12-22 RX ORDER — PROCHLORPERAZINE MALEATE 10 MG
10 TABLET ORAL EVERY 6 HOURS PRN
COMMUNITY
End: 2021-01-01 | Stop reason: ALTCHOICE

## 2020-12-22 RX ORDER — LORAZEPAM 0.5 MG/1
0.5 TABLET ORAL EVERY 8 HOURS PRN
COMMUNITY
End: 2021-01-01 | Stop reason: ALTCHOICE

## 2020-12-22 RX ORDER — PROMETHAZINE HYDROCHLORIDE 6.25 MG/5ML
SYRUP ORAL 4 TIMES DAILY PRN
COMMUNITY
End: 2021-01-01 | Stop reason: ALTCHOICE

## 2020-12-22 RX ORDER — IRBESARTAN 150 MG/1
150 TABLET ORAL NIGHTLY
COMMUNITY

## 2020-12-22 RX ORDER — GUAIFENESIN AND CODEINE PHOSPHATE 100; 10 MG/5ML; MG/5ML
5 SOLUTION ORAL 3 TIMES DAILY PRN
COMMUNITY
End: 2021-01-01 | Stop reason: ALTCHOICE

## 2020-12-22 RX ORDER — DEXAMETHASONE 2 MG/1
2 TABLET ORAL 2 TIMES DAILY WITH MEALS
COMMUNITY
End: 2021-01-01

## 2020-12-22 RX ORDER — OMEPRAZOLE 20 MG/1
20 CAPSULE, DELAYED RELEASE ORAL DAILY
COMMUNITY

## 2020-12-22 RX ORDER — SODIUM PHOSPHATE,MONO-DIBASIC 19G-7G/118
ENEMA (ML) RECTAL
COMMUNITY
End: 2021-01-01

## 2020-12-23 ENCOUNTER — INFUSION (OUTPATIENT)
Dept: ONCOLOGY | Facility: HOSPITAL | Age: 65
End: 2020-12-23

## 2020-12-23 ENCOUNTER — TELEPHONE (OUTPATIENT)
Dept: ONCOLOGY | Facility: CLINIC | Age: 65
End: 2020-12-23

## 2020-12-23 VITALS
TEMPERATURE: 97.9 F | RESPIRATION RATE: 17 BRPM | OXYGEN SATURATION: 100 % | HEIGHT: 65 IN | BODY MASS INDEX: 27.63 KG/M2 | DIASTOLIC BLOOD PRESSURE: 56 MMHG | SYSTOLIC BLOOD PRESSURE: 131 MMHG | HEART RATE: 80 BPM | WEIGHT: 165.8 LBS

## 2020-12-23 DIAGNOSIS — N18.30 STAGE 3 CHRONIC KIDNEY DISEASE, UNSPECIFIED WHETHER STAGE 3A OR 3B CKD (HCC): Primary | ICD-10-CM

## 2020-12-23 DIAGNOSIS — N28.9 FUNCTION KIDNEY DECREASED: Primary | ICD-10-CM

## 2020-12-23 LAB — CEA SERPL-MCNC: 3.13 NG/ML

## 2020-12-23 PROCEDURE — 96360 HYDRATION IV INFUSION INIT: CPT

## 2020-12-23 PROCEDURE — 96361 HYDRATE IV INFUSION ADD-ON: CPT

## 2020-12-23 RX ADMIN — SODIUM CHLORIDE 1000 ML: 9 INJECTION, SOLUTION INTRAVENOUS at 12:55

## 2020-12-23 NOTE — TELEPHONE ENCOUNTER
----- Message from Magan Metz MD sent at 12/22/2020  5:19 PM CST -----  Labs 12/12/2020–BUN 25, creatinine 1.24, GFR 43.  Please schedule I liter normal saline over 2 hours daily x2 then recheck CMP following the second liter of IV fluids.  Thank you

## 2020-12-23 NOTE — PROGRESS NOTES
Called office and spoke with Lizzie, she will let HANNAH Serrano, know we need orders for IVF put in for today and tomorrow.

## 2020-12-23 NOTE — TELEPHONE ENCOUNTER
Left message for Juana that her kidney function is decreased and Dr. Velez wants her to get 2 days of IV fluids and recheck labs after 2nd day.  Scheduled for fluids today at noon and tomorrow at 0830.

## 2020-12-24 ENCOUNTER — INFUSION (OUTPATIENT)
Dept: ONCOLOGY | Facility: HOSPITAL | Age: 65
End: 2020-12-24

## 2020-12-24 ENCOUNTER — LAB (OUTPATIENT)
Dept: LAB | Facility: HOSPITAL | Age: 65
End: 2020-12-24

## 2020-12-24 VITALS
SYSTOLIC BLOOD PRESSURE: 108 MMHG | WEIGHT: 164 LBS | BODY MASS INDEX: 27.32 KG/M2 | HEART RATE: 67 BPM | OXYGEN SATURATION: 100 % | DIASTOLIC BLOOD PRESSURE: 69 MMHG | RESPIRATION RATE: 16 BRPM | HEIGHT: 65 IN | TEMPERATURE: 96.2 F

## 2020-12-24 DIAGNOSIS — N28.9 FUNCTION KIDNEY DECREASED: Primary | ICD-10-CM

## 2020-12-24 DIAGNOSIS — N18.30 STAGE 3 CHRONIC KIDNEY DISEASE, UNSPECIFIED WHETHER STAGE 3A OR 3B CKD (HCC): ICD-10-CM

## 2020-12-24 LAB
ALBUMIN SERPL-MCNC: 3.9 G/DL (ref 3.5–5.2)
ALBUMIN/GLOB SERPL: 1.3 G/DL
ALP SERPL-CCNC: 90 U/L (ref 39–117)
ALT SERPL W P-5'-P-CCNC: 24 U/L (ref 1–33)
ANION GAP SERPL CALCULATED.3IONS-SCNC: 9 MMOL/L (ref 5–15)
AST SERPL-CCNC: 26 U/L (ref 1–32)
BILIRUB SERPL-MCNC: 0.3 MG/DL (ref 0–1.2)
BUN SERPL-MCNC: 19 MG/DL (ref 8–23)
BUN/CREAT SERPL: 20.4 (ref 7–25)
CALCIUM SPEC-SCNC: 8.9 MG/DL (ref 8.6–10.5)
CHLORIDE SERPL-SCNC: 106 MMOL/L (ref 98–107)
CO2 SERPL-SCNC: 24 MMOL/L (ref 22–29)
CREAT SERPL-MCNC: 0.93 MG/DL (ref 0.57–1)
GFR SERPL CREATININE-BSD FRML MDRD: 61 ML/MIN/1.73
GLOBULIN UR ELPH-MCNC: 2.9 GM/DL
GLUCOSE SERPL-MCNC: 87 MG/DL (ref 65–99)
POTASSIUM SERPL-SCNC: 3.8 MMOL/L (ref 3.5–5.2)
PROT SERPL-MCNC: 6.8 G/DL (ref 6–8.5)
SODIUM SERPL-SCNC: 139 MMOL/L (ref 136–145)

## 2020-12-24 PROCEDURE — 80053 COMPREHEN METABOLIC PANEL: CPT

## 2020-12-24 PROCEDURE — 36415 COLL VENOUS BLD VENIPUNCTURE: CPT

## 2020-12-24 PROCEDURE — 96361 HYDRATE IV INFUSION ADD-ON: CPT

## 2020-12-24 PROCEDURE — 96360 HYDRATION IV INFUSION INIT: CPT

## 2020-12-24 RX ADMIN — SODIUM CHLORIDE 1000 ML: 9 INJECTION, SOLUTION INTRAVENOUS at 08:52

## 2020-12-29 ENCOUNTER — OFFICE VISIT (OUTPATIENT)
Dept: ONCOLOGY | Facility: CLINIC | Age: 65
End: 2020-12-29

## 2020-12-29 VITALS — BODY MASS INDEX: 27.29 KG/M2 | HEIGHT: 65 IN

## 2020-12-29 DIAGNOSIS — C34.90 ADENOCARCINOMA OF LUNG, UNSPECIFIED LATERALITY (HCC): Primary | ICD-10-CM

## 2020-12-29 PROCEDURE — 99442 PR PHYS/QHP TELEPHONE EVALUATION 11-20 MIN: CPT | Performed by: NURSE PRACTITIONER

## 2020-12-29 NOTE — PROGRESS NOTES
CHEMOTHERAPY PREPARATION    Juana Erickson  [id number]  1955 12/29/2020    TELEPHONE VISIT  You have chosen to receive care through a telephone visit. Do you consent to use a telephone visit for your medical care today? Yes    Chief Complaint: chemo education     History of present illness:  Juana Erickson is a 65 y.o. year old female who is here today for chemotherapy preparation and needs assessment. The patient has been diagnosed with Stage III NSCLC and is scheduled to begin treatment with Imfinizi.     Oncology History:    Oncology/Hematology History   Adenocarcinoma, lung (CMS/HCC)   8/31/2020 Initial Diagnosis    Adenocarcinoma, lung (CMS/HCC)     12/28/2020 -  Chemotherapy    PRN RETACRIT & NEUPOGEN     12/28/2020 -  Chemotherapy    OP LUNG Durvalumab         Past Medical History:   Diagnosis Date   • Arthropathy    • Depression    • Hyperlipidemia    • Hypertension    • Myalgia    • Small cell lung cancer in adult (CMS/HCC)        Past Surgical History:   Procedure Laterality Date   • BREAST BIOPSY     • COLONOSCOPY  01/2010   • COLONOSCOPY  11/23/2016   • LUNG BIOPSY  08/12/2020       Family History   Problem Relation Age of Onset   • Parkinsonism Mother    • Aneurysm Father        Medications: The current medication list was reviewed and reconciled.     Allergies:  is allergic to nsaids.    Review of Systems:    Review of Systems   Constitutional: Positive for fatigue. Negative for activity change, appetite change, chills, diaphoresis, fever and unexpected weight change.   HENT: Negative for congestion, facial swelling, mouth sores, nosebleeds, sore throat, trouble swallowing and voice change.    Eyes: Negative for discharge and redness.   Respiratory: Negative for cough, chest tightness, shortness of breath and wheezing.    Cardiovascular: Negative for chest pain, palpitations and leg swelling.   Gastrointestinal: Negative for abdominal distention, abdominal pain, blood in stool, constipation,  diarrhea, nausea and vomiting.   Endocrine: Negative.    Genitourinary: Negative for difficulty urinating, dysuria, frequency, hematuria and urgency.   Musculoskeletal: Negative for arthralgias, gait problem and myalgias.   Skin: Negative for color change and rash.   Neurological: Positive for weakness. Negative for dizziness, light-headedness, numbness and headaches.   Hematological: Negative for adenopathy. Does not bruise/bleed easily.   Psychiatric/Behavioral: Negative for confusion and sleep disturbance. The patient is not nervous/anxious.        Physical Exam: Physical exam is limited as this is a telephone visit.  Patient was alert and oriented on the phone. She was in no distress. Her respirations were unlabored.     ECOG: (2) Ambulatory & Capable of Self Care, Unable to Carry Out Work Activity, Up & About Greater Than 50% of Waking Hours        NEEDS ASSESSMENTS  Psychosocial  The patient has completed a PHQ-9 Depression Screening and the Distress Thermometer (DT) today.   PHQ-9 results show 0 (No Depression). The patient scored their distress today as 0 on a scale of 0-10 with 0 being no distress and 10 being extreme distress.   Problems marked by the patient as being an issue for them within the last week include no problems.   Results were reviewed along with psychosocial resources offered by our cancer center. Our oncology social worker will be flagged for a DT score of 4 or above, and a same day call will be made for a score of 9 or 10. A mental health referral is not recommended at this time. Copies of patient's questionnaires will be scanned into EMR for details and further reference.    Barriers to care  A barriers form was also completed by the patient today. We discussed services offered by our facility to help her have adequate access to care.     VAD Assessment  The patient and I discussed planned intervenous chemotherapy as well as other IV treatments that are often needed throughout the course  of treatment. These may include, but are not limited to blood transfusions, antibiotics, and IV hydration. The vasculature does not appear to be adequate for multiple peripheral IVs throughout their treatment course. Discussed risks and benefits of VADs. The patient would like to pursue Port-A-Cath insertion prior to initiation of treatment.     Advanced Care Planning  Advance Care Planning   ACP discussion was held with the patient during this visit. Patient does not have an advance directive, information provided.    Additional Referral needs  none      CHEMOTHERAPY EDUCATION    Booklets Given: Handouts from [employer] was discussed with the patient.  She had the handout given to her previously.  This was reviewed with her and discussed.                                                                                                                                                                Chemotherapy Regimen:   Treatment Plans     Name Type Plan Dates Plan Provider         Active    OP SUPPORTIVE HYDRATION + ANTIEMETICS ONCOLOGY SUPPORTIVE CARE 1  12/23/2020 - Present Magan Metz MD     OP LUNG Durvalumab ONCOLOGY TREATMENT  12/27/2020 - Present Magan Metz MD                    TOPICS EDUCATION PROVIDED COMMENTS   ANEMIA:  role of RBC, cause, s/s, ways to manage, role of transfusion []    THROMBOCYTOPENIA:  role of platelet, cause, s/s, ways to prevent bleeding, things to avoid, when to seek help []    NEUTROPENIA:  role of WBC, cause, infection precautions, s/s of infection, when to call MD []    NUTRITION & APPETITE CHANGES:  importance of maintaining healthy diet & weight, ways to manage to improve intake, dietary consult, exercise regimen [x]    DIARRHEA:  causes, s/s of dehydration, ways to manage, dietary changes, when to call MD [x] Advised patient if develops diarrhea to call the office for adequate treatment   CONSTIPATION:  causes, ways to manage, dietary changes, when to call MD  [x]    NAUSEA & VOMITING:  cause, use of antiemetics, dietary changes, when to call MD [x] Advised to patient nausea is uncommon   MOUTH SORES:  causes, oral care, ways to manage []    ALOPECIA:  cause, ways to manage, resources []    INFERTILITY & SEXUALITY:  causes, fertility preservation options, sexuality changes, ways to manage, importance of birth control []    NERVOUS SYSTEM CHANGES:  causes, s/s, neuropathies, cognitive changes, ways to manage []    PAIN:  causes, ways to manage []    SKIN & NAIL CHANGES:  cause, s/s, ways to manage [x] Discussed the possibilities of rash and itching and to call if intolerable.    ORGAN TOXICITIES:  cause, s/s, need for diagnostic tests, labs, when to notify MD [x] Advised we would obtain labs before each treatment to monitor liver, kidneys, thyroid ect.   SURVIVORSHIP:  distress, distress assessment, secondary malignancies, early/late effects, follow-up, social issues, social support [x]    HOME CARE:  use of spill kits, storing of PO chemo, how to manage bodily fluids [x]    MISCELLANEOUS:  drug interactions, administration, vesicant, et [x]        Assessment and Plan:    Diagnoses and all orders for this visit:    1. Adenocarcinoma of lung, unspecified laterality (CMS/HCC) (Primary)    This was a 20 minute telephone visit with 20 minutes spent in  counseling and coordination of care as documented above.   The patient and I have reviewed their new cancer diagnosis and scheduled treatment plan. Needs assessment was completed including genetics, psychosocial needs, barriers to care, VAD evaluation, advanced care planning, and palliative care services. Referrals have been ordered as appropriate based upon our evaluation and patient desires.     Chemotherapy teaching was also completed today as documented above. Adequate time was given to answer all questions to her satisfaction. Patient and family are aware of their care team members and contact information if they have  questions or problems throughout the treatment course. Needs assessments and education has been completed. The patient is adequately prepared to begin treatment as scheduled.     I advised the patient that she can take Tylenol or Ibuprofen as needed for aches/pains related to cancer/treatment. I also advised patient she could use Senakot or Miralax as needed for constipation or Imodium as needed for diarrhea.       I reviewed with the patient the care team members. I also reviewed the option of calling our oncology office for evaluation and management of symptoms related to treatment.      Patricia Rahman, APRN  12/29/2020

## 2020-12-31 ENCOUNTER — INFUSION (OUTPATIENT)
Dept: ONCOLOGY | Facility: HOSPITAL | Age: 65
End: 2020-12-31

## 2020-12-31 ENCOUNTER — LAB (OUTPATIENT)
Dept: LAB | Facility: HOSPITAL | Age: 65
End: 2020-12-31

## 2020-12-31 VITALS
DIASTOLIC BLOOD PRESSURE: 50 MMHG | BODY MASS INDEX: 27.49 KG/M2 | HEIGHT: 65 IN | WEIGHT: 165 LBS | RESPIRATION RATE: 18 BRPM | TEMPERATURE: 96.9 F | SYSTOLIC BLOOD PRESSURE: 119 MMHG | HEART RATE: 79 BPM | OXYGEN SATURATION: 100 %

## 2020-12-31 DIAGNOSIS — R94.6 ABNORMAL RESULTS OF THYROID FUNCTION STUDIES: ICD-10-CM

## 2020-12-31 DIAGNOSIS — C34.90 ADENOCARCINOMA OF LUNG, UNSPECIFIED LATERALITY (HCC): Primary | ICD-10-CM

## 2020-12-31 DIAGNOSIS — C34.90 ADENOCARCINOMA OF LUNG, UNSPECIFIED LATERALITY (HCC): ICD-10-CM

## 2020-12-31 DIAGNOSIS — E87.6 HYPOKALEMIA: ICD-10-CM

## 2020-12-31 LAB
ALBUMIN SERPL-MCNC: 4.2 G/DL (ref 3.5–5.2)
ALBUMIN/GLOB SERPL: 1.4 G/DL
ALP SERPL-CCNC: 103 U/L (ref 39–117)
ALT SERPL W P-5'-P-CCNC: 19 U/L (ref 1–33)
ANION GAP SERPL CALCULATED.3IONS-SCNC: 12 MMOL/L (ref 5–15)
AST SERPL-CCNC: 22 U/L (ref 1–32)
BASOPHILS # BLD AUTO: 0.03 10*3/MM3 (ref 0–0.2)
BASOPHILS NFR BLD AUTO: 0.7 % (ref 0–1.5)
BILIRUB SERPL-MCNC: 0.2 MG/DL (ref 0–1.2)
BUN SERPL-MCNC: 18 MG/DL (ref 8–23)
BUN/CREAT SERPL: 16.8 (ref 7–25)
CALCIUM SPEC-SCNC: 9.5 MG/DL (ref 8.6–10.5)
CHLORIDE SERPL-SCNC: 100 MMOL/L (ref 98–107)
CO2 SERPL-SCNC: 23 MMOL/L (ref 22–29)
CREAT SERPL-MCNC: 1.07 MG/DL (ref 0.57–1)
DEPRECATED RDW RBC AUTO: 56.3 FL (ref 37–54)
EOSINOPHIL # BLD AUTO: 0.06 10*3/MM3 (ref 0–0.4)
EOSINOPHIL NFR BLD AUTO: 1.4 % (ref 0.3–6.2)
ERYTHROCYTE [DISTWIDTH] IN BLOOD BY AUTOMATED COUNT: 16.5 % (ref 12.3–15.4)
GFR SERPL CREATININE-BSD FRML MDRD: 51 ML/MIN/1.73
GLOBULIN UR ELPH-MCNC: 2.9 GM/DL
GLUCOSE SERPL-MCNC: 106 MG/DL (ref 65–99)
HCT VFR BLD AUTO: 32.6 % (ref 34–46.6)
HGB BLD-MCNC: 11.7 G/DL (ref 12–15.9)
IMM GRANULOCYTES # BLD AUTO: 0.01 10*3/MM3 (ref 0–0.05)
IMM GRANULOCYTES NFR BLD AUTO: 0.2 % (ref 0–0.5)
LYMPHOCYTES # BLD AUTO: 0.34 10*3/MM3 (ref 0.7–3.1)
LYMPHOCYTES NFR BLD AUTO: 8.1 % (ref 19.6–45.3)
MAGNESIUM SERPL-MCNC: 1.5 MG/DL (ref 1.6–2.4)
MCH RBC QN AUTO: 33.2 PG (ref 26.6–33)
MCHC RBC AUTO-ENTMCNC: 35.9 G/DL (ref 31.5–35.7)
MCV RBC AUTO: 92.6 FL (ref 79–97)
MONOCYTES # BLD AUTO: 0.55 10*3/MM3 (ref 0.1–0.9)
MONOCYTES NFR BLD AUTO: 13.1 % (ref 5–12)
NEUTROPHILS NFR BLD AUTO: 3.22 10*3/MM3 (ref 1.7–7)
NEUTROPHILS NFR BLD AUTO: 76.5 % (ref 42.7–76)
NRBC BLD AUTO-RTO: 0 /100 WBC (ref 0–0.2)
PLATELET # BLD AUTO: 179 10*3/MM3 (ref 140–450)
PMV BLD AUTO: 10.1 FL (ref 6–12)
POTASSIUM SERPL-SCNC: 4.2 MMOL/L (ref 3.5–5.2)
PROT SERPL-MCNC: 7.1 G/DL (ref 6–8.5)
RBC # BLD AUTO: 3.52 10*6/MM3 (ref 3.77–5.28)
SODIUM SERPL-SCNC: 135 MMOL/L (ref 136–145)
T4 FREE SERPL-MCNC: 1.33 NG/DL (ref 0.93–1.7)
TSH SERPL DL<=0.05 MIU/L-ACNC: 2.87 UIU/ML (ref 0.27–4.2)
WBC # BLD AUTO: 4.21 10*3/MM3 (ref 3.4–10.8)

## 2020-12-31 PROCEDURE — 25010000002 DEXAMETHASONE SODIUM PHOSPHATE 100 MG/10ML SOLUTION: Performed by: NURSE PRACTITIONER

## 2020-12-31 PROCEDURE — 36415 COLL VENOUS BLD VENIPUNCTURE: CPT

## 2020-12-31 PROCEDURE — 83735 ASSAY OF MAGNESIUM: CPT

## 2020-12-31 PROCEDURE — 25010000002 MAGNESIUM SULFATE 2 GM/50ML SOLUTION: Performed by: INTERNAL MEDICINE

## 2020-12-31 PROCEDURE — 25010000002 DIPHENHYDRAMINE PER 50 MG: Performed by: NURSE PRACTITIONER

## 2020-12-31 PROCEDURE — 96367 TX/PROPH/DG ADDL SEQ IV INF: CPT

## 2020-12-31 PROCEDURE — 25010000002 DURVALUMAB 500 MG/10ML SOLUTION 10 ML VIAL: Performed by: NURSE PRACTITIONER

## 2020-12-31 PROCEDURE — 84439 ASSAY OF FREE THYROXINE: CPT

## 2020-12-31 PROCEDURE — 80053 COMPREHEN METABOLIC PANEL: CPT

## 2020-12-31 PROCEDURE — 84443 ASSAY THYROID STIM HORMONE: CPT

## 2020-12-31 PROCEDURE — 96366 THER/PROPH/DIAG IV INF ADDON: CPT

## 2020-12-31 PROCEDURE — 85025 COMPLETE CBC W/AUTO DIFF WBC: CPT

## 2020-12-31 PROCEDURE — 25010000002 DURVALUMAB 120 MG/2.4ML SOLUTION 2.4 ML VIAL: Performed by: NURSE PRACTITIONER

## 2020-12-31 PROCEDURE — 96413 CHEMO IV INFUSION 1 HR: CPT

## 2020-12-31 PROCEDURE — 96375 TX/PRO/DX INJ NEW DRUG ADDON: CPT

## 2020-12-31 RX ORDER — FAMOTIDINE 10 MG/ML
20 INJECTION, SOLUTION INTRAVENOUS AS NEEDED
Status: CANCELLED | OUTPATIENT
Start: 2020-12-31

## 2020-12-31 RX ORDER — FAMOTIDINE 10 MG/ML
20 INJECTION, SOLUTION INTRAVENOUS AS NEEDED
Status: DISCONTINUED | OUTPATIENT
Start: 2020-12-31 | End: 2020-12-31 | Stop reason: HOSPADM

## 2020-12-31 RX ORDER — SODIUM CHLORIDE 9 MG/ML
250 INJECTION, SOLUTION INTRAVENOUS ONCE
Status: CANCELLED | OUTPATIENT
Start: 2020-12-31

## 2020-12-31 RX ORDER — MAGNESIUM SULFATE HEPTAHYDRATE 40 MG/ML
2 INJECTION, SOLUTION INTRAVENOUS ONCE
Status: COMPLETED | OUTPATIENT
Start: 2020-12-31 | End: 2020-12-31

## 2020-12-31 RX ORDER — FAMOTIDINE 10 MG/ML
10 INJECTION, SOLUTION INTRAVENOUS ONCE
Status: COMPLETED | OUTPATIENT
Start: 2020-12-31 | End: 2020-12-31

## 2020-12-31 RX ORDER — DIPHENHYDRAMINE HYDROCHLORIDE 50 MG/ML
50 INJECTION INTRAMUSCULAR; INTRAVENOUS AS NEEDED
Status: CANCELLED | OUTPATIENT
Start: 2020-12-31

## 2020-12-31 RX ORDER — DIPHENHYDRAMINE HYDROCHLORIDE 50 MG/ML
50 INJECTION INTRAMUSCULAR; INTRAVENOUS AS NEEDED
Status: DISCONTINUED | OUTPATIENT
Start: 2020-12-31 | End: 2020-12-31 | Stop reason: HOSPADM

## 2020-12-31 RX ORDER — FAMOTIDINE 10 MG/ML
10 INJECTION, SOLUTION INTRAVENOUS ONCE
Status: CANCELLED | OUTPATIENT
Start: 2020-12-31

## 2020-12-31 RX ORDER — SODIUM CHLORIDE 9 MG/ML
250 INJECTION, SOLUTION INTRAVENOUS ONCE
Status: COMPLETED | OUTPATIENT
Start: 2020-12-31 | End: 2020-12-31

## 2020-12-31 RX ADMIN — DIPHENHYDRAMINE HYDROCHLORIDE 25 MG: 50 INJECTION, SOLUTION INTRAMUSCULAR; INTRAVENOUS at 09:48

## 2020-12-31 RX ADMIN — MAGNESIUM SULFATE HEPTAHYDRATE 2 G: 40 INJECTION, SOLUTION INTRAVENOUS at 11:13

## 2020-12-31 RX ADMIN — DEXAMETHASONE SODIUM PHOSPHATE 12 MG: 10 INJECTION, SOLUTION INTRAMUSCULAR; INTRAVENOUS at 09:27

## 2020-12-31 RX ADMIN — FAMOTIDINE 10 MG: 10 INJECTION INTRAVENOUS at 09:26

## 2020-12-31 RX ADMIN — SODIUM CHLORIDE 720 MG: 9 INJECTION, SOLUTION INTRAVENOUS at 10:07

## 2020-12-31 RX ADMIN — SODIUM CHLORIDE 250 ML: 9 INJECTION, SOLUTION INTRAVENOUS at 09:25

## 2021-01-01 ENCOUNTER — APPOINTMENT (OUTPATIENT)
Dept: LAB | Facility: HOSPITAL | Age: 66
End: 2021-01-01

## 2021-01-01 ENCOUNTER — APPOINTMENT (OUTPATIENT)
Dept: CT IMAGING | Facility: HOSPITAL | Age: 66
End: 2021-01-01

## 2021-01-01 ENCOUNTER — TELEPHONE (OUTPATIENT)
Dept: PULMONOLOGY | Facility: CLINIC | Age: 66
End: 2021-01-01

## 2021-01-01 ENCOUNTER — HOSPITAL ENCOUNTER (OUTPATIENT)
Dept: GENERAL RADIOLOGY | Facility: HOSPITAL | Age: 66
Discharge: HOME | End: 2021-01-26
Admitting: INTERNAL MEDICINE

## 2021-01-01 ENCOUNTER — APPOINTMENT (OUTPATIENT)
Dept: ONCOLOGY | Facility: HOSPITAL | Age: 66
End: 2021-01-01

## 2021-01-01 ENCOUNTER — TELEPHONE (OUTPATIENT)
Dept: ONCOLOGY | Facility: CLINIC | Age: 66
End: 2021-01-01

## 2021-01-01 ENCOUNTER — OFFICE VISIT (OUTPATIENT)
Dept: PULMONOLOGY | Facility: CLINIC | Age: 66
End: 2021-01-01

## 2021-01-01 ENCOUNTER — TRANSCRIBE ORDERS (OUTPATIENT)
Dept: LAB | Facility: HOSPITAL | Age: 66
End: 2021-01-01

## 2021-01-01 ENCOUNTER — INFUSION (OUTPATIENT)
Dept: ONCOLOGY | Facility: HOSPITAL | Age: 66
End: 2021-01-01

## 2021-01-01 ENCOUNTER — HOSPITAL ENCOUNTER (OUTPATIENT)
Dept: GENERAL RADIOLOGY | Facility: HOSPITAL | Age: 66
Discharge: HOME | End: 2021-02-03
Admitting: INTERNAL MEDICINE

## 2021-01-01 ENCOUNTER — LAB (OUTPATIENT)
Dept: LAB | Facility: HOSPITAL | Age: 66
End: 2021-01-01

## 2021-01-01 ENCOUNTER — HOSPITAL ENCOUNTER (OUTPATIENT)
Dept: CT IMAGING | Facility: HOSPITAL | Age: 66
Discharge: HOME | End: 2021-02-22
Admitting: INTERNAL MEDICINE

## 2021-01-01 ENCOUNTER — OFFICE VISIT (OUTPATIENT)
Dept: ONCOLOGY | Facility: CLINIC | Age: 66
End: 2021-01-01

## 2021-01-01 ENCOUNTER — HOSPITAL ENCOUNTER (OUTPATIENT)
Dept: GENERAL RADIOLOGY | Facility: HOSPITAL | Age: 66
Discharge: HOME | End: 2021-02-10
Admitting: INTERNAL MEDICINE

## 2021-01-01 VITALS
WEIGHT: 166.2 LBS | HEART RATE: 107 BPM | HEIGHT: 65 IN | BODY MASS INDEX: 27.69 KG/M2 | OXYGEN SATURATION: 94 % | DIASTOLIC BLOOD PRESSURE: 70 MMHG | SYSTOLIC BLOOD PRESSURE: 138 MMHG | TEMPERATURE: 97.4 F | RESPIRATION RATE: 16 BRPM

## 2021-01-01 VITALS
SYSTOLIC BLOOD PRESSURE: 124 MMHG | OXYGEN SATURATION: 92 % | RESPIRATION RATE: 18 BRPM | BODY MASS INDEX: 27.22 KG/M2 | WEIGHT: 163.4 LBS | HEIGHT: 65 IN | DIASTOLIC BLOOD PRESSURE: 76 MMHG | HEART RATE: 95 BPM | TEMPERATURE: 97.5 F

## 2021-01-01 VITALS
SYSTOLIC BLOOD PRESSURE: 119 MMHG | TEMPERATURE: 97.9 F | DIASTOLIC BLOOD PRESSURE: 60 MMHG | RESPIRATION RATE: 18 BRPM | HEART RATE: 87 BPM | HEIGHT: 65 IN | OXYGEN SATURATION: 98 % | WEIGHT: 164 LBS | BODY MASS INDEX: 27.32 KG/M2

## 2021-01-01 VITALS
HEIGHT: 65 IN | DIASTOLIC BLOOD PRESSURE: 83 MMHG | SYSTOLIC BLOOD PRESSURE: 95 MMHG | RESPIRATION RATE: 18 BRPM | TEMPERATURE: 98.3 F | WEIGHT: 162 LBS | BODY MASS INDEX: 26.99 KG/M2 | OXYGEN SATURATION: 97 % | HEART RATE: 93 BPM

## 2021-01-01 VITALS
DIASTOLIC BLOOD PRESSURE: 70 MMHG | TEMPERATURE: 96.9 F | HEIGHT: 65 IN | SYSTOLIC BLOOD PRESSURE: 124 MMHG | HEART RATE: 112 BPM | OXYGEN SATURATION: 97 % | BODY MASS INDEX: 27.16 KG/M2 | WEIGHT: 163 LBS

## 2021-01-01 DIAGNOSIS — E87.6 HYPOKALEMIA: Primary | ICD-10-CM

## 2021-01-01 DIAGNOSIS — R91.8 GROUND GLASS OPACITY PRESENT ON IMAGING OF LUNG: Primary | ICD-10-CM

## 2021-01-01 DIAGNOSIS — C34.90 ADENOCARCINOMA OF LUNG, UNSPECIFIED LATERALITY (HCC): ICD-10-CM

## 2021-01-01 DIAGNOSIS — R91.1 SOLITARY PULMONARY NODULE: ICD-10-CM

## 2021-01-01 DIAGNOSIS — R94.6 ABNORMAL RESULTS OF THYROID FUNCTION STUDIES: ICD-10-CM

## 2021-01-01 DIAGNOSIS — R91.8 LUNG INFILTRATE: ICD-10-CM

## 2021-01-01 DIAGNOSIS — C34.90 ADENOCARCINOMA OF LUNG, UNSPECIFIED LATERALITY (HCC): Primary | ICD-10-CM

## 2021-01-01 DIAGNOSIS — U07.1 PNEUMONIA DUE TO COVID-19 VIRUS: Primary | ICD-10-CM

## 2021-01-01 DIAGNOSIS — R93.819 ABNORMAL RADIOLOGIC FINDINGS ON DIAGNOSTIC IMAGING OF UNSPECIFIED TESTICLE: ICD-10-CM

## 2021-01-01 DIAGNOSIS — J18.9 PNEUMONITIS: ICD-10-CM

## 2021-01-01 DIAGNOSIS — J12.82 PNEUMONIA DUE TO COVID-19 VIRUS: Primary | ICD-10-CM

## 2021-01-01 DIAGNOSIS — R05.3 CHRONIC COUGH: Primary | ICD-10-CM

## 2021-01-01 DIAGNOSIS — Z20.822 COVID-19 RULED OUT: ICD-10-CM

## 2021-01-01 DIAGNOSIS — J18.9 PNEUMONITIS: Primary | ICD-10-CM

## 2021-01-01 LAB
ALBUMIN SERPL-MCNC: 4.1 G/DL (ref 3.5–5.2)
ALBUMIN SERPL-MCNC: 4.1 G/DL (ref 3.5–5.2)
ALBUMIN/GLOB SERPL: 1.3 G/DL
ALBUMIN/GLOB SERPL: 1.4 G/DL
ALP SERPL-CCNC: 91 U/L (ref 39–117)
ALP SERPL-CCNC: 99 U/L (ref 39–117)
ALT SERPL W P-5'-P-CCNC: 17 U/L (ref 1–33)
ALT SERPL W P-5'-P-CCNC: 20 U/L (ref 1–33)
ANION GAP SERPL CALCULATED.3IONS-SCNC: 11 MMOL/L (ref 5–15)
ANION GAP SERPL CALCULATED.3IONS-SCNC: 9 MMOL/L (ref 5–15)
AST SERPL-CCNC: 16 U/L (ref 1–32)
AST SERPL-CCNC: 21 U/L (ref 1–32)
BASOPHILS # BLD AUTO: 0.03 10*3/MM3 (ref 0–0.2)
BASOPHILS # BLD AUTO: 0.06 10*3/MM3 (ref 0–0.2)
BASOPHILS NFR BLD AUTO: 0.3 % (ref 0–1.5)
BASOPHILS NFR BLD AUTO: 0.5 % (ref 0–1.5)
BILIRUB SERPL-MCNC: 0.3 MG/DL (ref 0–1.2)
BILIRUB SERPL-MCNC: 0.3 MG/DL (ref 0–1.2)
BUN SERPL-MCNC: 18 MG/DL (ref 8–23)
BUN SERPL-MCNC: 32 MG/DL (ref 8–23)
BUN/CREAT SERPL: 17 (ref 7–25)
BUN/CREAT SERPL: 29.6 (ref 7–25)
CALCIUM SPEC-SCNC: 9.4 MG/DL (ref 8.6–10.5)
CALCIUM SPEC-SCNC: 9.6 MG/DL (ref 8.6–10.5)
CHLORIDE SERPL-SCNC: 101 MMOL/L (ref 98–107)
CHLORIDE SERPL-SCNC: 104 MMOL/L (ref 98–107)
CO2 SERPL-SCNC: 24 MMOL/L (ref 22–29)
CO2 SERPL-SCNC: 25 MMOL/L (ref 22–29)
CORTIS SERPL-MCNC: 5.41 MCG/DL
CREAT SERPL-MCNC: 1.06 MG/DL (ref 0.57–1)
CREAT SERPL-MCNC: 1.08 MG/DL (ref 0.57–1)
DEPRECATED RDW RBC AUTO: 46.7 FL (ref 37–54)
DEPRECATED RDW RBC AUTO: 50.6 FL (ref 37–54)
EOSINOPHIL # BLD AUTO: 0 10*3/MM3 (ref 0–0.4)
EOSINOPHIL # BLD AUTO: 0.1 10*3/MM3 (ref 0–0.4)
EOSINOPHIL NFR BLD AUTO: 0 % (ref 0.3–6.2)
EOSINOPHIL NFR BLD AUTO: 1.7 % (ref 0.3–6.2)
ERYTHROCYTE [DISTWIDTH] IN BLOOD BY AUTOMATED COUNT: 13.1 % (ref 12.3–15.4)
ERYTHROCYTE [DISTWIDTH] IN BLOOD BY AUTOMATED COUNT: 14.8 % (ref 12.3–15.4)
GFR SERPL CREATININE-BSD FRML MDRD: 51 ML/MIN/1.73
GFR SERPL CREATININE-BSD FRML MDRD: 52 ML/MIN/1.73
GLOBULIN UR ELPH-MCNC: 2.9 GM/DL
GLOBULIN UR ELPH-MCNC: 3.1 GM/DL
GLUCOSE SERPL-MCNC: 100 MG/DL (ref 65–99)
GLUCOSE SERPL-MCNC: 88 MG/DL (ref 65–99)
HCT VFR BLD AUTO: 31.6 % (ref 34–46.6)
HCT VFR BLD AUTO: 40.1 % (ref 34–46.6)
HGB BLD-MCNC: 11.1 G/DL (ref 12–15.9)
HGB BLD-MCNC: 12.9 G/DL (ref 12–15.9)
IMM GRANULOCYTES # BLD AUTO: 0.02 10*3/MM3 (ref 0–0.05)
IMM GRANULOCYTES # BLD AUTO: 0.45 10*3/MM3 (ref 0–0.05)
IMM GRANULOCYTES NFR BLD AUTO: 0.3 % (ref 0–0.5)
IMM GRANULOCYTES NFR BLD AUTO: 2.1 % (ref 0–0.5)
LYMPHOCYTES # BLD AUTO: 0.31 10*3/MM3 (ref 0.7–3.1)
LYMPHOCYTES # BLD AUTO: 0.48 10*3/MM3 (ref 0.7–3.1)
LYMPHOCYTES NFR BLD AUTO: 2.3 % (ref 19.6–45.3)
LYMPHOCYTES NFR BLD AUTO: 5.4 % (ref 19.6–45.3)
MAGNESIUM SERPL-MCNC: 1.7 MG/DL (ref 1.6–2.4)
MCH RBC QN AUTO: 31 PG (ref 26.6–33)
MCH RBC QN AUTO: 32.7 PG (ref 26.6–33)
MCHC RBC AUTO-ENTMCNC: 32.2 G/DL (ref 31.5–35.7)
MCHC RBC AUTO-ENTMCNC: 35.1 G/DL (ref 31.5–35.7)
MCV RBC AUTO: 93.2 FL (ref 79–97)
MCV RBC AUTO: 96.4 FL (ref 79–97)
MONOCYTES # BLD AUTO: 0.54 10*3/MM3 (ref 0.1–0.9)
MONOCYTES # BLD AUTO: 1.33 10*3/MM3 (ref 0.1–0.9)
MONOCYTES NFR BLD AUTO: 6.3 % (ref 5–12)
MONOCYTES NFR BLD AUTO: 9.4 % (ref 5–12)
NEUTROPHILS NFR BLD AUTO: 18.85 10*3/MM3 (ref 1.7–7)
NEUTROPHILS NFR BLD AUTO: 4.74 10*3/MM3 (ref 1.7–7)
NEUTROPHILS NFR BLD AUTO: 82.7 % (ref 42.7–76)
NEUTROPHILS NFR BLD AUTO: 89 % (ref 42.7–76)
NRBC BLD AUTO-RTO: 0 /100 WBC (ref 0–0.2)
NRBC BLD AUTO-RTO: 0 /100 WBC (ref 0–0.2)
PLATELET # BLD AUTO: 212 10*3/MM3 (ref 140–450)
PLATELET # BLD AUTO: 236 10*3/MM3 (ref 140–450)
PMV BLD AUTO: 10 FL (ref 6–12)
PMV BLD AUTO: 10.2 FL (ref 6–12)
POTASSIUM SERPL-SCNC: 4.1 MMOL/L (ref 3.5–5.2)
POTASSIUM SERPL-SCNC: 4.4 MMOL/L (ref 3.5–5.2)
PROT SERPL-MCNC: 7 G/DL (ref 6–8.5)
PROT SERPL-MCNC: 7.2 G/DL (ref 6–8.5)
RBC # BLD AUTO: 3.39 10*6/MM3 (ref 3.77–5.28)
RBC # BLD AUTO: 4.16 10*6/MM3 (ref 3.77–5.28)
SARS-COV-2 RNA RESP QL NAA+PROBE: NOT DETECTED
SODIUM SERPL-SCNC: 135 MMOL/L (ref 136–145)
SODIUM SERPL-SCNC: 139 MMOL/L (ref 136–145)
T4 FREE SERPL-MCNC: 1.35 NG/DL (ref 0.93–1.7)
T4 FREE SERPL-MCNC: 1.61 NG/DL (ref 0.93–1.7)
TSH SERPL DL<=0.05 MIU/L-ACNC: 0.13 UIU/ML (ref 0.27–4.2)
TSH SERPL DL<=0.05 MIU/L-ACNC: 2.21 UIU/ML (ref 0.27–4.2)
WBC # BLD AUTO: 21.17 10*3/MM3 (ref 3.4–10.8)
WBC # BLD AUTO: 5.74 10*3/MM3 (ref 3.4–10.8)

## 2021-01-01 PROCEDURE — 85025 COMPLETE CBC W/AUTO DIFF WBC: CPT

## 2021-01-01 PROCEDURE — 99215 OFFICE O/P EST HI 40 MIN: CPT | Performed by: INTERNAL MEDICINE

## 2021-01-01 PROCEDURE — 84443 ASSAY THYROID STIM HORMONE: CPT

## 2021-01-01 PROCEDURE — 25010000002 DURVALUMAB 50 MG/ML SOLUTION 2.4 ML VIAL: Performed by: NURSE PRACTITIONER

## 2021-01-01 PROCEDURE — 96375 TX/PRO/DX INJ NEW DRUG ADDON: CPT

## 2021-01-01 PROCEDURE — 80053 COMPREHEN METABOLIC PANEL: CPT

## 2021-01-01 PROCEDURE — 83735 ASSAY OF MAGNESIUM: CPT

## 2021-01-01 PROCEDURE — 82533 TOTAL CORTISOL: CPT

## 2021-01-01 PROCEDURE — 36415 COLL VENOUS BLD VENIPUNCTURE: CPT

## 2021-01-01 PROCEDURE — G2212 PROLONG OUTPT/OFFICE VIS: HCPCS | Performed by: INTERNAL MEDICINE

## 2021-01-01 PROCEDURE — C9803 HOPD COVID-19 SPEC COLLECT: HCPCS | Performed by: INTERNAL MEDICINE

## 2021-01-01 PROCEDURE — 25010000002 DEXAMETHASONE SODIUM PHOSPHATE 100 MG/10ML SOLUTION: Performed by: NURSE PRACTITIONER

## 2021-01-01 PROCEDURE — 25010000002 DURVALUMAB 120 MG/2.4ML SOLUTION 2.4 ML VIAL: Performed by: NURSE PRACTITIONER

## 2021-01-01 PROCEDURE — 25010000002 IOPAMIDOL 61 % SOLUTION: Performed by: INTERNAL MEDICINE

## 2021-01-01 PROCEDURE — 71046 X-RAY EXAM CHEST 2 VIEWS: CPT

## 2021-01-01 PROCEDURE — 25010000002 DURVALUMAB 500 MG/10ML SOLUTION 10 ML VIAL: Performed by: NURSE PRACTITIONER

## 2021-01-01 PROCEDURE — 96413 CHEMO IV INFUSION 1 HR: CPT

## 2021-01-01 PROCEDURE — 84439 ASSAY OF FREE THYROXINE: CPT

## 2021-01-01 PROCEDURE — 96367 TX/PROPH/DG ADDL SEQ IV INF: CPT

## 2021-01-01 PROCEDURE — U0003 INFECTIOUS AGENT DETECTION BY NUCLEIC ACID (DNA OR RNA); SEVERE ACUTE RESPIRATORY SYNDROME CORONAVIRUS 2 (SARS-COV-2) (CORONAVIRUS DISEASE [COVID-19]), AMPLIFIED PROBE TECHNIQUE, MAKING USE OF HIGH THROUGHPUT TECHNOLOGIES AS DESCRIBED BY CMS-2020-01-R: HCPCS | Performed by: INTERNAL MEDICINE

## 2021-01-01 PROCEDURE — U0005 INFEC AGEN DETEC AMPLI PROBE: HCPCS | Performed by: INTERNAL MEDICINE

## 2021-01-01 PROCEDURE — 25010000002 DIPHENHYDRAMINE PER 50 MG: Performed by: NURSE PRACTITIONER

## 2021-01-01 PROCEDURE — 71260 CT THORAX DX C+: CPT

## 2021-01-01 PROCEDURE — 99204 OFFICE O/P NEW MOD 45 MIN: CPT | Performed by: INTERNAL MEDICINE

## 2021-01-01 PROCEDURE — 25010000002 DURVALUMAB 50 MG/ML SOLUTION 10 ML VIAL: Performed by: NURSE PRACTITIONER

## 2021-01-01 RX ORDER — CEPHALEXIN 250 MG/1
250 CAPSULE ORAL 4 TIMES DAILY
COMMUNITY
End: 2021-01-01 | Stop reason: ALTCHOICE

## 2021-01-01 RX ORDER — FAMOTIDINE 10 MG/ML
20 INJECTION, SOLUTION INTRAVENOUS AS NEEDED
Status: CANCELLED | OUTPATIENT
Start: 2021-01-01

## 2021-01-01 RX ORDER — PREDNISONE 10 MG/1
TABLET ORAL
Qty: 105 TABLET | Refills: 0 | OUTPATIENT
Start: 2021-01-01

## 2021-01-01 RX ORDER — SODIUM CHLORIDE 9 MG/ML
250 INJECTION, SOLUTION INTRAVENOUS ONCE
Status: COMPLETED | OUTPATIENT
Start: 2021-01-01 | End: 2021-01-01

## 2021-01-01 RX ORDER — FAMOTIDINE 10 MG/ML
10 INJECTION, SOLUTION INTRAVENOUS ONCE
Status: COMPLETED | OUTPATIENT
Start: 2021-01-01 | End: 2021-01-01

## 2021-01-01 RX ORDER — FAMOTIDINE 10 MG/ML
10 INJECTION, SOLUTION INTRAVENOUS ONCE
Status: CANCELLED | OUTPATIENT
Start: 2021-01-01

## 2021-01-01 RX ORDER — SODIUM CHLORIDE 9 MG/ML
250 INJECTION, SOLUTION INTRAVENOUS ONCE
Status: CANCELLED | OUTPATIENT
Start: 2021-01-01

## 2021-01-01 RX ORDER — PREDNISONE 10 MG/1
TABLET ORAL
Qty: 105 TABLET | Refills: 0 | Status: SHIPPED | OUTPATIENT
Start: 2021-01-01 | End: 2021-01-01

## 2021-01-01 RX ORDER — PREDNISONE 20 MG/1
80 TABLET ORAL DAILY
Qty: 28 TABLET | Refills: 0 | Status: SHIPPED | OUTPATIENT
Start: 2021-01-01 | End: 2021-01-01 | Stop reason: ALTCHOICE

## 2021-01-01 RX ORDER — FLUTICASONE PROPIONATE 110 UG/1
2 AEROSOL, METERED RESPIRATORY (INHALATION)
Qty: 12 G | Refills: 5 | Status: SHIPPED | OUTPATIENT
Start: 2021-01-01 | End: 2021-01-01 | Stop reason: CLARIF

## 2021-01-01 RX ORDER — AZITHROMYCIN 250 MG/1
500 TABLET, FILM COATED ORAL DAILY
Status: DISCONTINUED | OUTPATIENT
Start: 2021-01-01 | End: 2021-01-01 | Stop reason: HOSPADM

## 2021-01-01 RX ORDER — PREDNISONE 10 MG/1
TABLET ORAL
Qty: 14 TABLET | Refills: 0 | Status: SHIPPED | OUTPATIENT
Start: 2021-01-01 | End: 2021-01-01 | Stop reason: DRUGHIGH

## 2021-01-01 RX ORDER — FAMOTIDINE 10 MG/ML
20 INJECTION, SOLUTION INTRAVENOUS AS NEEDED
Status: DISCONTINUED | OUTPATIENT
Start: 2021-01-01 | End: 2021-01-01 | Stop reason: HOSPADM

## 2021-01-01 RX ORDER — DIPHENHYDRAMINE HYDROCHLORIDE 50 MG/ML
50 INJECTION INTRAMUSCULAR; INTRAVENOUS AS NEEDED
Status: DISCONTINUED | OUTPATIENT
Start: 2021-01-01 | End: 2021-01-01 | Stop reason: HOSPADM

## 2021-01-01 RX ORDER — DIPHENHYDRAMINE HYDROCHLORIDE 50 MG/ML
50 INJECTION INTRAMUSCULAR; INTRAVENOUS AS NEEDED
Status: CANCELLED | OUTPATIENT
Start: 2021-01-01

## 2021-01-01 RX ORDER — FLUTICASONE FUROATE 100 UG/1
1 POWDER RESPIRATORY (INHALATION) DAILY
Qty: 3 EACH | Refills: 3 | Status: SHIPPED | OUTPATIENT
Start: 2021-01-01 | End: 2021-01-01

## 2021-01-01 RX ORDER — FLUTICASONE FUROATE 100 UG/1
1 POWDER RESPIRATORY (INHALATION) DAILY
Qty: 30 EACH | Refills: 11 | Status: CANCELLED | OUTPATIENT
Start: 2021-01-01

## 2021-01-01 RX ORDER — PREDNISONE 20 MG/1
TABLET ORAL
Qty: 28 TABLET | Refills: 0 | Status: SHIPPED | OUTPATIENT
Start: 2021-01-01 | End: 2021-01-01 | Stop reason: SDUPTHER

## 2021-01-01 RX ORDER — PREDNISONE 10 MG/1
20 TABLET ORAL DAILY
Qty: 60 TABLET | Refills: 3 | Status: SHIPPED | OUTPATIENT
Start: 2021-01-01 | End: 2021-01-01

## 2021-01-01 RX ORDER — PREDNISONE 20 MG/1
80 TABLET ORAL DAILY
Qty: 28 TABLET | Refills: 0 | OUTPATIENT
Start: 2021-01-01 | End: 2021-01-01 | Stop reason: SDUPTHER

## 2021-01-01 RX ORDER — PREDNISONE 20 MG/1
80 TABLET ORAL DAILY
Qty: 28 TABLET | Refills: 0 | OUTPATIENT
Start: 2021-01-01 | End: 2021-01-01

## 2021-01-01 RX ORDER — PREDNISONE 10 MG/1
TABLET ORAL
Qty: 14 TABLET | Refills: 0 | Status: SHIPPED | OUTPATIENT
Start: 2021-01-01 | End: 2021-01-01 | Stop reason: SDUPTHER

## 2021-01-01 RX ORDER — AZITHROMYCIN 250 MG/1
TABLET, FILM COATED ORAL
Qty: 6 TABLET | Refills: 0 | OUTPATIENT
Start: 2021-01-01 | End: 2021-01-01 | Stop reason: SDUPTHER

## 2021-01-01 RX ADMIN — SODIUM CHLORIDE 720 MG: 9 INJECTION, SOLUTION INTRAVENOUS at 12:23

## 2021-01-01 RX ADMIN — SODIUM CHLORIDE 720 MG: 9 INJECTION, SOLUTION INTRAVENOUS at 09:35

## 2021-01-01 RX ADMIN — SODIUM CHLORIDE 250 ML: 9 INJECTION, SOLUTION INTRAVENOUS at 08:59

## 2021-01-01 RX ADMIN — DIPHENHYDRAMINE HYDROCHLORIDE 25 MG: 50 INJECTION, SOLUTION INTRAMUSCULAR; INTRAVENOUS at 11:49

## 2021-01-01 RX ADMIN — DIPHENHYDRAMINE HYDROCHLORIDE 25 MG: 50 INJECTION, SOLUTION INTRAMUSCULAR; INTRAVENOUS at 08:59

## 2021-01-01 RX ADMIN — FAMOTIDINE 10 MG: 10 INJECTION INTRAVENOUS at 09:01

## 2021-01-01 RX ADMIN — FAMOTIDINE 10 MG: 10 INJECTION INTRAVENOUS at 11:27

## 2021-01-01 RX ADMIN — DEXAMETHASONE SODIUM PHOSPHATE 12 MG: 10 INJECTION, SOLUTION INTRAMUSCULAR; INTRAVENOUS at 09:19

## 2021-01-01 RX ADMIN — SODIUM CHLORIDE 250 ML: 9 INJECTION, SOLUTION INTRAVENOUS at 11:26

## 2021-01-01 RX ADMIN — IOPAMIDOL 100 ML: 612 INJECTION, SOLUTION INTRAVENOUS at 16:24

## 2021-01-01 RX ADMIN — DEXAMETHASONE SODIUM PHOSPHATE 12 MG: 10 INJECTION, SOLUTION INTRAMUSCULAR; INTRAVENOUS at 11:29

## 2021-01-05 ENCOUNTER — TELEPHONE (OUTPATIENT)
Dept: ONCOLOGY | Facility: CLINIC | Age: 66
End: 2021-01-05

## 2021-01-05 DIAGNOSIS — C34.90 ADENOCARCINOMA OF LUNG, UNSPECIFIED LATERALITY (HCC): Primary | ICD-10-CM

## 2021-01-05 NOTE — TELEPHONE ENCOUNTER
Discussed with DR. Metz. Patient had 2g magnesium last week. Dr. Narvaez would like for her to come in to recheck her CMP this week to recheck the BUN and creatinine.     Spoke with patient and she can come in tomorrow. Call sent to [facility] to schedule.

## 2021-01-05 NOTE — TELEPHONE ENCOUNTER
----- Message from Magan Metz MD sent at 12/31/2020  1:21 PM CST -----  Labs, 12/31/2020–BUN 18, creatinine 1.07–GFR 51, magnesium 1.5.  Please: 1.schedule I liter normal saline over 2 hours x 1; 2.administer 2 g IV magnesium

## 2021-01-06 ENCOUNTER — LAB (OUTPATIENT)
Dept: LAB | Facility: HOSPITAL | Age: 66
End: 2021-01-06

## 2021-01-06 ENCOUNTER — TELEPHONE (OUTPATIENT)
Dept: ONCOLOGY | Facility: CLINIC | Age: 66
End: 2021-01-06

## 2021-01-06 DIAGNOSIS — C34.90 ADENOCARCINOMA OF LUNG, UNSPECIFIED LATERALITY (HCC): Primary | ICD-10-CM

## 2021-01-06 LAB
ALBUMIN SERPL-MCNC: 4.2 G/DL (ref 3.5–5.2)
ALBUMIN/GLOB SERPL: 1.4 G/DL
ALP SERPL-CCNC: 95 U/L (ref 39–117)
ALT SERPL W P-5'-P-CCNC: 18 U/L (ref 1–33)
ANION GAP SERPL CALCULATED.3IONS-SCNC: 9 MMOL/L (ref 5–15)
AST SERPL-CCNC: 22 U/L (ref 1–32)
BILIRUB SERPL-MCNC: 0.5 MG/DL (ref 0–1.2)
BUN SERPL-MCNC: 16 MG/DL (ref 8–23)
BUN/CREAT SERPL: 16.5 (ref 7–25)
CALCIUM SPEC-SCNC: 9.4 MG/DL (ref 8.6–10.5)
CHLORIDE SERPL-SCNC: 95 MMOL/L (ref 98–107)
CO2 SERPL-SCNC: 27 MMOL/L (ref 22–29)
CREAT SERPL-MCNC: 0.97 MG/DL (ref 0.57–1)
GFR SERPL CREATININE-BSD FRML MDRD: 58 ML/MIN/1.73
GLOBULIN UR ELPH-MCNC: 3.1 GM/DL
GLUCOSE SERPL-MCNC: 114 MG/DL (ref 65–99)
POTASSIUM SERPL-SCNC: 4.6 MMOL/L (ref 3.5–5.2)
PROT SERPL-MCNC: 7.3 G/DL (ref 6–8.5)
SODIUM SERPL-SCNC: 131 MMOL/L (ref 136–145)
WHOLE BLOOD HOLD SPECIMEN: NORMAL

## 2021-01-06 PROCEDURE — 80053 COMPREHEN METABOLIC PANEL: CPT

## 2021-01-06 PROCEDURE — 36415 COLL VENOUS BLD VENIPUNCTURE: CPT

## 2021-01-06 NOTE — TELEPHONE ENCOUNTER
----- Message from Magan Metz MD sent at 1/6/2021 11:07 AM CST -----  Labs 1/6/2021–sodium 131, chloride 95, GFR 58.  Please fax these labs to Dr. Sheldon (PCP) Re: Hyponatremia.  Thank you

## 2021-01-23 NOTE — PROGRESS NOTES
[facility] ON[facility] GROUP HEMATOLOGY AND ONCOLOGY  [address]  [place] [id number] [phone]    Patient Name: Juana Erickson  Encounter Date: 01/26/2021  YOB: 1955  Patient Number: [id number]      REASON FOR VISIT: Juana Erickson is a 65-year-old female who returns in follow-up since assumption of care for known stage IIIC adenocarcinoma of the lung for which she received a total of 3 cycles of chemotherapy (carboplatin and pemetrexed, C1, followed by carboplatin and Taxol weekly x2, most recently on 11/17/2020).  Given the patient's poor tolerance to chemo (throat swelling) they decided to forego the last cycle of chemotherapy. That she has been started on maintenance durvalumab, C1 on 12/31/2020, and C2 on 01/14/2020.  She is seen to discuss the diagnostic information gathered since her initial visit and for subsequent management planning.  She is here alone with her female partner/spouse, Sera on facetime.    Oncologic history:  --07/2020-initial presentation with shortness of breath.  Received antibiotics and prednisone with no improvement.    --07/23/2020.  Chest CT showed a 3 x 1.6 cm right perihilar mass confluent with mediastinal and hilar lymph nodes.  Right lower lobe infiltrate present.  Bilateral multiple PE with right heart strain.  Hospitalized outside hospital.    --08/10/2020-CT chest shows large calcified right paratracheal lymph nodes, other calcified mediastinal, right hilar, right supraclavicular and periaortic lymph nodes.  Right middle lobe nodule 2.7 cm in the left lower lobe 1.3 cm nodule.  Bibasilar atelectasis.    --8/12/2020: Bronchoscopy/biopsy right middle lobe-atypical cells suspicious for malignancy, right station 7. 12 lymph nodes adenocarcinoma.    --9/17/2020-PET/CT hypermetabolic right supraclavicular node.  Mediastinal and right hilar lymph nodes which is confluent with the right perihilar primary lung  malignancy.  Periesophageal lymph node involvement.  Right middle lobe and right lower lobe lesions with minimal FDG avid 80.  No FDG avid disease in the left lung, abdomen or pelvis.    --10/2/2020-concurrent chemoradiotherapy-first cycle of chemo with carbo and pemetrexed.  Complicated by dysphagia odynophagia sore throat, epistaxis.  Seen by ENT.  Had scope in edema and cricoid area was found treated with Medrol Dosepak, dysphagia improved.  Delay of second cycle of chemo (due 10/23/2020) due to above issues and then hospitalization.    --Hospitalization from 10/28/2020 to 11/1/2020 for stroke after complaints of headache and dizziness.  MRI found ischemic stroke, subacute in nature.  Work-up with echo did not show source of the emboli from the heart.  Patient on anticoagulation, neuro recommends MRI in 1 month, discharged on 11/1/2020.    --11/02/2020-clinic visit.  Signed consent to start chemo with carboplatin and paclitaxel.  Chemo scheduled for 11/3/2020    --11/20/2020-  most recent office encounter by Dr. Zbigniew Sharif, medical oncology at .  Patient apparently received a total of 3 cycles of chemotherapy (carboplatin and pemetrexed, C1, followed by carboplatin and Taxol weekly x2, most recently on 11/17/2020).  Given the patient's poor tolerance to chemo (throat swelling) they decided to forego the last cycle of chemotherapy.  Therefore the patient will not receive any more chemotherapy or radiation.  Explained that she would benefit from durvalumab maintenance if CT scan in 3 weeks or so does not show progression of disease.  Immunotherapy with durvalumab recommended.  Plan: No more chemo radiation.  CT scan in 3 to 4 weeks before durvalumab.  Recommend durvalumab if no disease progression.    -- 12/14/2020- Chest CT. The dominant nodule within the right middle lobe has decreased in size.  A 10 mm nodule within the right lower lobe is stable.  Indeterminate subpleural 8 mm nodule within the anterior  right middle lobe appears to be slightly enlarged.    --Maintenance durvalumab beginning 12/31/2020 (C1) through 01/14/2020 (C2).    LABS    Lab Results - Last 18 Months   Lab Units 01/14/21  0820 12/31/20  0823 12/22/20  1634 09/01/20  0254 08/31/20  1729   HEMOGLOBIN g/dL 11.1* 11.7* 10.7* 12.0 12.8   HEMATOCRIT % 31.6* 32.6* 30.6* 36.7 39.4   MCV fL 93.2 92.6 92.7 87.8 88.3   WBC 10*3/mm3 5.74 4.21 7.10 9.48 12.38*   RDW % 14.8 16.5* 17.4* 14.2 14.1   MPV fL 10.0 10.1 10.2 11.7 11.1   PLATELETS 10*3/mm3 212 179 196 164 168   IMM GRAN % % 0.3 0.2 0.6* 0.7* 0.7*   NEUTROS ABS 10*3/mm3 4.74 3.22 6.46 6.00 9.15*   LYMPHS ABS 10*3/mm3 0.31* 0.34* 0.25* 2.30 1.83   MONOS ABS 10*3/mm3 0.54 0.55 0.32 0.69 0.98*   EOS ABS 10*3/mm3 0.10 0.06 0.00 0.37 0.27   BASOS ABS 10*3/mm3 0.03 0.03 0.03 0.05 0.06   IMMATURE GRANS (ABS) 10*3/mm3 0.02 0.01 0.04 0.07* 0.09*   NRBC /100 WBC 0.0 0.0 0.0 0.0 0.0       Lab Results - Last 18 Months   Lab Units 01/14/21  0820 01/06/21  1010 12/31/20  0823 12/24/20  1049 12/22/20  1634 12/14/20  1324 09/01/20  0254 08/31/20  1729   GLUCOSE mg/dL 100* 114* 106* 87 146*  --  104* 99   SODIUM mmol/L 135* 131* 135* 139 139  --  137 140   POTASSIUM mmol/L 4.4 4.6 4.2 3.8 4.5  --  4.2 4.1   CO2 mmol/L 25.0 27.0 23.0 24.0 26.0  --  22.0 24.0   CHLORIDE mmol/L 101 95* 100 106 105  --  103 102   ANION GAP mmol/L 9.0 9.0 12.0 9.0 8.0  --  12.0 14.0   CREATININE mg/dL 1.06* 0.97 1.07* 0.93 1.24* 1.10 1.01* 1.09*   BUN mg/dL 18 16 18 19 25*  --  17 17   BUN / CREAT RATIO  17.0 16.5 16.8 20.4 20.2  --  16.8 15.6   CALCIUM mg/dL 9.6 9.4 9.5 8.9 9.4  --  9.2 9.6   EGFR IF NONAFRICN AM mL/min/1.73 52* 58* 51* 61 43*  --  55* 50*   ALK PHOS U/L 99 95 103 90 100  --   --  140*   TOTAL PROTEIN g/dL 7.0 7.3 7.1 6.8 7.3  --   --  7.2   ALT (SGPT) U/L 17 18 19 24 28  --   --  19   AST (SGOT) U/L 21 22 22 26 26  --   --  23   BILIRUBIN mg/dL 0.3 0.5 0.2 0.3 <0.2  --   --  0.2   ALBUMIN g/dL 4.10 4.20 4.20 3.90 4.40   --   --  4.20   GLOBULIN gm/dL 2.9 3.1 2.9 2.9 2.9  --   --  3.0         PAST MEDICAL HISTORY:  ALLERGIES:  Allergies   Allergen Reactions   • Nsaids Other (See Comments)     Raises blood pressure.      CURRENT MEDICATIONS:  Outpatient Encounter Medications as of 1/26/2021   Medication Sig Dispense Refill   • cetirizine (zyrTEC) 10 MG tablet Take 10 mg by mouth Daily.     • guaiFENesin-codeine (Virtussin A/C) 100-10 MG/5ML liquid Take 5 mL by mouth 3 (Three) Times a Day As Needed for Cough.     • irbesartan (AVAPRO) 150 MG tablet Take 150 mg by mouth Every Night. 1/2 am and 1/2 pm     • levothyroxine (SYNTHROID, LEVOTHROID) 88 MCG tablet Take 88 mcg by mouth Daily.     • LORazepam (ATIVAN) 0.5 MG tablet Take 0.5 mg by mouth Every 8 (Eight) Hours As Needed for Anxiety.     • Omega-3 Fatty Acids (FISH OIL) 1000 MG capsule capsule Take 1,000 mg by mouth Daily With Breakfast.     • omeprazole (priLOSEC) 20 MG capsule Take 20 mg by mouth Daily.     • prochlorperazine (COMPAZINE) 10 MG tablet Take 10 mg by mouth Every 6 (Six) Hours As Needed for Nausea or Vomiting.     • promethazine (PHENERGAN) 6.25 MG/5ML syrup Take  by mouth 4 (Four) Times a Day As Needed for Nausea or Vomiting.     • rivaroxaban (XARELTO) 20 MG tablet Take 20 mg by mouth Daily.     • rosuvastatin (CRESTOR) 10 MG tablet Take 10 mg by mouth Daily.     • sertraline (ZOLOFT) 100 MG tablet Take 100 mg by mouth Daily.     • traZODone (DESYREL) 150 MG tablet Take 37.5-50 mg by mouth Every Night.     • [DISCONTINUED] albuterol sulfate  (90 Base) MCG/ACT inhaler Inhale 2 puffs Every 4 (Four) Hours As Needed for Shortness of Air.     • [DISCONTINUED] calcium carbonate (OS-IAN) 600 MG tablet Take 600 mg by mouth 2 (Two) Times a Day With Meals.     • [DISCONTINUED] coenzyme Q10 100 MG capsule Take 200 mg by mouth Daily.     • [DISCONTINUED] Colostrum 500 MG capsule Take 500 mg by mouth Daily.     • [DISCONTINUED] dexamethasone (DECADRON) 2 MG tablet Take 2  mg by mouth 2 (Two) Times a Day With Meals.     • [DISCONTINUED] Flaxseed, Linseed, (FLAXSEED OIL MAX STR) 1300 MG capsule Take 1,300 mg by mouth Daily.     • [DISCONTINUED] glucosamine-chondroitin 500-400 MG capsule capsule Take  by mouth 3 (Three) Times a Day With Meals.     • [DISCONTINUED] Multiple Vitamins-Minerals (MULTIVITAMIN ADULT PO) Take 1 tablet by mouth Daily.     • [DISCONTINUED] oxyCODONE (Roxicodone) 5 MG immediate release tablet Take  by mouth.     • [DISCONTINUED] vitamin C (ASCORBIC ACID) 250 MG tablet Take 500 mg by mouth Daily.       No facility-administered encounter medications on file as of 1/26/2021.      ADULT ILLNESSES:  Patient Active Problem List   Diagnosis Code   • Pulmonary embolus (CMS/HCC) I26.99   • Sinus tachycardia R00.0   • Adenocarcinoma, lung (CMS/HCC) C34.90   • Chronic cough R05   • Stage 3 chronic kidney disease (CMS/HCC) N18.30   • Anxiety F41.9   • Asthma J45.909   • Essential hypertension I10   • Hyperlipidemia E78.5   • Hypothyroidism E03.9   • Lung mass R91.8   • Obstructive sleep apnea syndrome G47.33   • Osteoarthritis M19.90   • Osteopenia M85.80   • Overweight E66.3   • Solitary pulmonary nodule R91.1   • Function kidney decreased N28.9   • Hypokalemia E87.6     SURGERIES:  Past Surgical History:   Procedure Laterality Date   • BREAST BIOPSY     • COLONOSCOPY  01/2010   • COLONOSCOPY  11/23/2016   • LUNG BIOPSY  08/12/2020     HEALTH MAINTENANCE ITEMS:  Health Maintenance Due   Topic Date Due   • COLONOSCOPY  1955   • ZOSTER VACCINE (1 of 2) 07/07/2005   • HEPATITIS C SCREENING  05/15/2017   • ANNUAL WELLNESS VISIT  05/15/2017   • MAMMOGRAM  05/15/2017   • PAP SMEAR  05/15/2017   • LIPID PANEL  05/15/2017   • Pneumococcal Vaccine 65+ (1 of 1 - PPSV23) 07/07/2020   • INFLUENZA VACCINE  08/01/2020       <no information>  Last Completed Colonoscopy       Status Date      COLONOSCOPY No completions recorded          There is no immunization history on file for  this patient.  Last Completed Mammogram       Status Date      MAMMOGRAM No completions recorded            FAMILY HISTORY:  Family History   Problem Relation Age of Onset   • Parkinsonism Mother    • Aneurysm Father      SOCIAL HISTORY:  Social History     Socioeconomic History   • Marital status:      Spouse name: Not on file   • Number of children: Not on file   • Years of education: Not on file   • Highest education level: Not on file   Tobacco Use   • Smoking status: Former Smoker     Packs/day: 0.50   • Smokeless tobacco: Never Used   Substance and Sexual Activity   • Alcohol use: Yes     Comment: occasional    • Drug use: Never   • Sexual activity: Defer       REVIEW OF SYSTEMS:  Review of Systems   Constitutional: Positive for activity change (tires easier), appetite change (not as hungry) and fatigue (tires faster). Negative for chills, diaphoresis, fever, unexpected weight gain and unexpected weight loss.        Manages her ADLs, including chores, but has not been running errands.  Is again driving.  Is up ~ 50%    Durvalumab tolerance:   Has had a dry cough in the morning and at night for the past 2 weeks (pneumonitis interstitial lung disease), with some [place], no jaundice (hepatitis), no diarrhea (colitis), some worsening fatigue (hypothyroidism, hyperthyroidism, thyroiditis, hypopituitarism, hemolytic anemia), no bruising (thrombocytopenic purpura, nephritis), no headaches (aseptic meningitis), no chest pain (myocarditis), no fever (severe infection), no vision changes (uveitis), no infusion reactions, no skin reaction, no musculoskeletal pain, no constipation, no decreased appetite, no nausea, no edema, no urinary tract infection, no abdominal pain, no pyrexia.   HENT: Negative for rhinorrhea, sinus pressure, sore throat, swollen glands (resolved with tapering decadron ), trouble swallowing (odynophagia.  resolved on steroids) and voice change (hoarseness resolved).    Eyes: Negative.   "  Respiratory: Positive for shortness of breath (exertional dyspnea but no sob with routine activities). Negative for cough.         No tobacco use   Cardiovascular: Negative.    Gastrointestinal: Negative.  Negative for constipation, diarrhea, nausea and vomiting.   Endocrine: Negative.    Genitourinary: Negative.    Musculoskeletal: Positive for arthralgias (hands, feet, back, \"arthritis\") and back pain. Negative for joint swelling (ankle swelling resolved) and myalgias (leg cramps at night in the past).   Skin: Negative.    Neurological: Positive for tremors (\"come and go\"), numbness (hands) and memory problem (forgetfulness since the stroke, \"comes and goes\").   Hematological: Negative.    Psychiatric/Behavioral: Positive for depressed mood (situational - isolation). The patient is nervous/anxious.        /76   Pulse 95   Temp 97.5 °F (36.4 °C) (Temporal)   Resp 18   Ht 165.1 cm (65\")   Wt 74.1 kg (163 lb 6.4 oz)   LMP  (LMP Unknown)   SpO2 92%   BMI 27.19 kg/m²  Body surface area is 1.82 meters squared.  Pain Score    01/26/21 1342   PainSc: 2  Comment: both lgs       Physical Exam:  Physical Exam  Vitals signs reviewed.   Constitutional:       Appearance: Normal appearance.      Comments: Pleasant, cooperative, heavy-set, well kept female, ambulatory, ECOG 1.      No weight changes in the interval   HENT:      Head: Normocephalic and atraumatic.      Mouth/Throat:      Mouth: Mucous membranes are moist.      Pharynx: No oropharyngeal exudate.      Comments: Wearing a surgical mask today  Eyes:      General: No scleral icterus.     Extraocular Movements: Extraocular movements intact.      Pupils: Pupils are equal, round, and reactive to light.   Neck:      Musculoskeletal: Normal range of motion and neck supple.   Cardiovascular:      Rate and Rhythm: Normal rate and regular rhythm.   Pulmonary:      Effort: Pulmonary effort is normal.      Breath sounds: Normal breath sounds. No stridor. No " wheezing, rhonchi or rales.   Abdominal:      General: Abdomen is flat.      Palpations: Abdomen is soft.      Tenderness: There is no abdominal tenderness. There is no guarding.   Musculoskeletal: Normal range of motion.         General: No swelling.      Right lower leg: No edema.      Left lower leg: No edema.   Lymphadenopathy:      Cervical: No cervical adenopathy.   Skin:     General: Skin is warm.      Coloration: Skin is not jaundiced or pale.      Findings: No bruising, erythema, lesion or rash.   Neurological:      General: No focal deficit present.      Mental Status: She is alert and oriented to person, place, and time.      Cranial Nerves: No cranial nerve deficit.   Psychiatric:         Mood and Affect: Mood normal.         Behavior: Behavior normal.         Thought Content: Thought content normal.         ASSESSMENT:   1.  Lung adenocarcinoma.                Stage: IIIC (cT4, cN3, M0)         Tumor burden: 3 x 1.6 cm right perihilar mass confluent with mediastinal and hilar lymphadenopathy (chest CT 7/23/2020).  Path with hypermetabolic right supraclavicular node, mediastinal and hilar lymph nodes confluent with right perihilar primary lung malignancy.  Periesophageal lymph node involvement.  Right middle lobe and right lower lobe lesions with minimal FDG avidity.          PD-L1-40% (+)         CEA: 3.13, 12/22/2020.         Tumor status:   --Concurrent chemoradiation therapy, first cycle of carboplatin pemetrexed, 10/2/2020.  Subsequent chemo delayed (due 10/23/2020) due to hospitalization for stroke.  --C2, 10/27/2020 and C3, 11/17/2020 (carboplatin/taxol)  --As of 11/02/2020 had received 17 fractions of radiation - completed 30 fx 11/18/2020  --Maintenance durvalumab, 12/31/2020 (C1); 01/14/2020 (C2).    2.  Ischemic stroke.  3.  Extensive bilateral pulmonary embolism.  Remains on Xarelto.  4.  Post chemotherapy associated throat/submandibular swelling.  Remains on tapering Decadron.  5. Chronic  kidney disease, stage III. GFR 52 mL/min, 01/14/2021 (prior range: 43-58)  6. Normocytic anemia, multifactorial with components from malignancy (anemia of chronic disease), chemotherapy, and chronic kidney disease. Stable, hemoglobin 11.1; MCV 93.2, 01/14/2021 (prior range: Hemoglobin 10.7-12.8; MCV 88.3-92.6)..  7.  Hyponatremia.  135, 01/14/2021 (prior: 131-139).  8.  Depression.  On Zoloft.  9.  Cough, not otherwise specified.            RECOMMENDATIONS:   1.  Apprised of labs, 12/31/2020 through 01/14/2021. Note the stable GFR, borderline hyponatremia, otherwise normal CMP, normal TSH, normal T4, stable anemia, otherwise normal CBC. Ferritin 246, iron 69, iron saturation 17%.  2.  Durvalumab tolerance discussed. So far so good.   3.  Previously apprised of chest CT, 12/14/2020 (above) showing decrease in dominant nodule within the right middle lobe and stable right lower lobe nodule.  4.  Previously reviewed NCCN guidelines version 6.2020 non-small cell lung cancer, stage III (unresectable).  Chemotherapy regimens used.  Radiation therapy (concurrent regimens usually radiation therapy): Paclitaxel 45 to 50 mg/m² weekly; carboplatin AUC, concurrent thoracic RT +/- additional 2 cycles every 21 days of paclitaxel 200 mg/m² and carboplatin AUC 6-followed by consolidation therapy for unresectable stage III NSCLC, PS 0-1 and no disease progression after 2 or more cycles of definitive chemoradiation: Durvalumab 10 mg/kg IV every 2 weeks for up to 12 months (category 1).    5. Previously discussed the potential toxicities of durvalumab to include but not limited to (immune mediated reaction, pneumonitis interstitial lung disease, hepatitis, colitis, severe diarrhea, hypothyroidism, hyperthyroidism, thyroiditis, type 1 diabetes, hypopituitarism, thrombocytopenic purpura, nephritis, aseptic meningitis, hemolytic anemia, myocarditis, severe infection, uveitis, infusion reaction, electrolyte abnormalities, lymphopenia,  anemia, skin reaction, fatigue, upper respiratory infection, musculoskeletal pain, constipation, decreased appetite, nausea, edema, urinary tract infection, abdominal pain, pyrexia, dyspnea, rash, cough). Questions answered.  She agrees to press on with therapy     6.  HOLD - C3 on 01/28/2021; C4 on 02/11/2021; C5 on 02/25/2021; C6 on 03/11/2021- durvalumab (plan: every 2 weeks x 24 cycles - 12 months, progression or toxicity) per administration guidelines - at [facility]                 durvalumab 10 mg/kg (WT = 74 kg; TD = 740 mg)      --Premeds:              Decadron 10 mg IV             Benadryl 25 mg IV             Pepcid 10 mg IV      7.  TSH, FT4, CMP, Mg++ and CBC with differential on day of durvalumab, with Procrit 40,000 units subcutaneous every 2 weeks if Hgb less than 10 and Hct less than 30; Neupogen 480 mcg subcutaneously daily x3 if ANC less than 1 - [facility]   8.   Keep appointment with  on 03/18/2021 to see radiation oncology and for surveillance scans  9.   Schedule chest xray today and CT scan of chest on 02/18/2021 at [facility]  10.  Rx:  Return to office 03/23/2021 with pre-office CEA, CBC and differential, CMP.      Addendum:  Chest x-ray today shows new right perihilar infiltrate.  Infection vs prior radiation vs immunotherapy associated pneumonitis.  Plan: 1.  Prednisone 1 mg/kg (80 mg/day) x 7 days; 2.  Start empiric Z-pack; 3.  Repeat chest xray x 1 week; 4.  Hold durvalumab treatments until we can reassess the repeat chest xray     I spent - 57 total minutes, face-to-face, caring for Juana harris.  Greater than 50% of this time involved counseling and/or coordination of care as documented within this note regarding the patient's illness(es), pros and cons of various treatment options, instructions and/or risk reduction.

## 2021-01-27 NOTE — TELEPHONE ENCOUNTER
----- Message from Magan Metz MD sent at 1/26/2021  4:38 PM CST -----  Regarding: change of plans  Chest xray with right perihilar infiltrate - change of plans: 1.  Prednisone 1 mg/kg (80 mg/day) x 7 days; 2.  Start empiric Z-pack; 3.  Repeat chest xray x 1 week; 4.  Hold durvalumab treatments until we can reassess the chest xray

## 2021-01-27 NOTE — TELEPHONE ENCOUNTER
Called patient on 01/26 and reviewed results of CXR.  Instructed that Dr Metz wants her to start Prednisone 80mg daily for 7 days and an antibiotic (Z-pack).  Instructed patient on medication, dosage, frequency, and to call with any problems.  Patient v/u and agreeable to plan.

## 2021-01-27 NOTE — TELEPHONE ENCOUNTER
----- Message from Magan Metz MD sent at 1/26/2021  7:19 PM CST -----  Chest x-ray today shows new right perihilar infiltrate.  Infection vs prior radiation vs immunotherapy associated pneumonitis.  Plan: 1.  Prednisone 1 mg/kg (80 mg/day) x 7 days; 2.  Start empiric Z-pack; 3.  Repeat chest xray x 1 week; 4.  Hold durvalumab treatments until we can reassess the repeat chest xray     Thank you

## 2021-02-03 NOTE — TELEPHONE ENCOUNTER
----- Message from Magan Metz MD sent at 2/3/2021 11:18 AM CST -----  Please ask her if she is still having cough and/or fever.  If yes, continue prednisone 1 mg/kg/week x 1 more week then recheck chest x-ray in 1 week.

## 2021-02-03 NOTE — TELEPHONE ENCOUNTER
Called patient and she states that she has completed the antibiotic and steroids.  States that she is feeling good without cough, fever, chills, or SOA.  Instructed that CXR has remained the same.  Talked to Dr Metz regarding patient-wants her to take Prednisone for one more week, repeat CXR next Wed 02/10, and if stable or improved then she can get her treatment on 02/11.  Called patient back and instructed on the above and to call with any problems.  Patient v/u and agreeable to plan.

## 2021-02-09 NOTE — TELEPHONE ENCOUNTER
Call from Juana wanting to know if she can move her treatment to tomorrow instead of Thursday because of the weather.  She has to have a cxr before because she has been on steroids.  Discussed with Dr. Metz and ok given to do treatment tomorrow if chest xray ok.  Notified Juana that she can get treatment tomorrow if chest xray ok at 10:45.  Verbalized understanding.

## 2021-02-10 NOTE — TELEPHONE ENCOUNTER
Received call from HANNAH Copeland regarding patient and CXR results.  States that she took Prednisone 40mg po this am.  Denies any fever, chills, SOA, or cough.  Dr Metz reviewed CXR and approved for patient to get treatment.  Does want patient to get IV Dexamethasone today.  Dinorah lisa

## 2021-02-13 NOTE — TELEPHONE ENCOUNTER
Call from patient stating that since receiving her durvalumab on 2/11/2021, she has been coughing like she had previously.  Cough is dry, no associated fever, no chills, no shortness of breath at rest.  Will call in another course of prednisone 80 mg daily x7 days.  Will repeat chest x-ray on Monday.  May need to hold, if not discontinue durvalumab.

## 2021-02-14 NOTE — TELEPHONE ENCOUNTER
"Call back from patient after yesterday's phone conversation (see telephone calls, 2/13/2021).  Has resumed prednisone 80 mg daily beginning 2/13/2021.  Calls today because she says her cough seems to be \"deeper\" but remains nonproductive.  Though she has no fevers, she requests that we call in an antibiotic as we did previously.  Thus will E prescribe a Z-Oscar which she has tolerated previously.  "

## 2021-02-15 NOTE — TELEPHONE ENCOUNTER
Called patient to see how she was feeling. States that she is feeling a better today.  Still having dry non-productive cough but is starting to feel better.  Taking Z-eduarda and steroids as prescribed.  Denies any fever, chills, or SOA.  Instructed to call with any problems.  Patient v/u.

## 2021-02-20 NOTE — PROGRESS NOTES
[facility] HEMATOLOGY AND ONCOLOGY  [address]  [place] [address] [phone]    Patient Name: Juana Erickson  Encounter Date: 02/22/2021  YOB: 1955  Patient Number: [id number]        REASON FOR VISIT: Juana Erickson is a 65-year-old female who returns in follow-up since assumption of care for known stage IIIC adenocarcinoma of the lung for which she received a total of 3 cycles of chemotherapy (carboplatin and pemetrexed, C1, followed by carboplatin and Taxol weekly x2, most recently on 11/17/2020).  Given the patient's poor tolerance to chemo (throat swelling) they decided to forego the last cycle of chemotherapy. She has since been started on maintenance durvalumab, C1 on 12/31/2020, then C2 on 01/14/2020 after which she developed a dry cough and lung infiltrates.  She received 2 weeks of prednisone.  Symptoms resolved but chest xrays showed persistent infiltrates.  She then received C3 on 02/10/2021 before developing cough anew, again requiring another round of steroids.  She is here with her female partner/spouse, Sera.    Oncologic history:  --07/2020-initial presentation with shortness of breath.  Received antibiotics and prednisone with no improvement.  --07/23/2020.  Chest CT showed a 3 x 1.6 cm right perihilar mass confluent with mediastinal and hilar lymph nodes.  Right lower lobe infiltrate present.  Bilateral multiple PE with right heart strain.  Hospitalized outside hospital.  --08/10/2020-CT chest shows large calcified right paratracheal lymph nodes, other calcified mediastinal, right hilar, right supraclavicular and periaortic lymph nodes.  Right middle lobe nodule 2.7 cm in the left lower lobe 1.3 cm nodule.  Bibasilar atelectasis.  --8/12/2020: Bronchoscopy/biopsy right middle lobe-atypical cells suspicious for malignancy, right station 7. 12 lymph nodes adenocarcinoma.  --9/17/2020-PET/CT hypermetabolic right  supraclavicular node.  Mediastinal and right hilar lymph nodes which is confluent with the right perihilar primary lung malignancy.  Periesophageal lymph node involvement.  Right middle lobe and right lower lobe lesions with minimal FDG avid 80.  No FDG avid disease in the left lung, abdomen or pelvis.  --10/2/2020-concurrent chemoradiotherapy-first cycle of chemo with carbo and pemetrexed.  Complicated by dysphagia odynophagia sore throat, epistaxis.  Seen by ENT.  Had scope in edema and cricoid area was found treated with Medrol Dosepak, dysphagia improved.  Delay of second cycle of chemo (due 10/23/2020) due to above issues and then hospitalization.  --Hospitalization from 10/28/2020 to 11/1/2020 for stroke after complaints of headache and dizziness.  MRI found ischemic stroke, subacute in nature.  Work-up with echo did not show source of the emboli from the heart.  Patient on anticoagulation, neuro recommends MRI in 1 month, discharged on 11/1/2020.  --11/02/2020-clinic visit.  Signed consent to start chemo with carboplatin and paclitaxel.  Chemo scheduled for 11/3/2020  --11/20/2020-  most recent office encounter by Dr. Zbigniew Sharif, medical oncology at .  Patient apparently received a total of 3 cycles of chemotherapy (carboplatin and pemetrexed, C1, followed by carboplatin and Taxol weekly x2, most recently on 11/17/2020).  Given the patient's poor tolerance to chemo (throat swelling) they decided to forego the last cycle of chemotherapy.  Therefore the patient will not receive any more chemotherapy or radiation.  Explained that she would benefit from durvalumab maintenance if CT scan in 3 weeks or so does not show progression of disease.  Immunotherapy with durvalumab recommended.  Plan: No more chemo radiation.  CT scan in 3 to 4 weeks before durvalumab.  Recommend durvalumab if no disease progression.  -- 12/14/2020- Chest CT. The dominant nodule within the right middle lobe has decreased in size.  A 10  mm nodule within the right lower lobe is stable.  Indeterminate subpleural 8 mm nodule within the anterior right middle lobe appears to be slightly enlarged.  --Maintenance durvalumab beginning 12/31/2020 (C1) through 02/10/2020 (C3).  Stop after development of cough, shortness of breath and lung infiltrates.    LABS    Lab Results - Last 18 Months   Lab Units 02/10/21  1009 01/14/21  0820 12/31/20  0823 12/22/20  1634 09/01/20  0254 08/31/20  1729   HEMOGLOBIN g/dL 12.9 11.1* 11.7* 10.7* 12.0 12.8   HEMATOCRIT % 40.1 31.6* 32.6* 30.6* 36.7 39.4   MCV fL 96.4 93.2 92.6 92.7 87.8 88.3   WBC 10*3/mm3 21.17* 5.74 4.21 7.10 9.48 12.38*   RDW % 13.1 14.8 16.5* 17.4* 14.2 14.1   MPV fL 10.2 10.0 10.1 10.2 11.7 11.1   PLATELETS 10*3/mm3 236 212 179 196 164 168   IMM GRAN % % 2.1* 0.3 0.2 0.6* 0.7* 0.7*   NEUTROS ABS 10*3/mm3 18.85* 4.74 3.22 6.46 6.00 9.15*   LYMPHS ABS 10*3/mm3 0.48* 0.31* 0.34* 0.25* 2.30 1.83   MONOS ABS 10*3/mm3 1.33* 0.54 0.55 0.32 0.69 0.98*   EOS ABS 10*3/mm3 0.00 0.10 0.06 0.00 0.37 0.27   BASOS ABS 10*3/mm3 0.06 0.03 0.03 0.03 0.05 0.06   IMMATURE GRANS (ABS) 10*3/mm3 0.45* 0.02 0.01 0.04 0.07* 0.09*   NRBC /100 WBC 0.0 0.0 0.0 0.0 0.0 0.0       Lab Results - Last 18 Months   Lab Units 02/10/21  1009 01/14/21  0820 01/06/21  1010 12/31/20  0823 12/24/20  1049 12/22/20  1634   GLUCOSE mg/dL 88 100* 114* 106* 87 146*   SODIUM mmol/L 139 135* 131* 135* 139 139   POTASSIUM mmol/L 4.1 4.4 4.6 4.2 3.8 4.5   CO2 mmol/L 24.0 25.0 27.0 23.0 24.0 26.0   CHLORIDE mmol/L 104 101 95* 100 106 105   ANION GAP mmol/L 11.0 9.0 9.0 12.0 9.0 8.0   CREATININE mg/dL 1.08* 1.06* 0.97 1.07* 0.93 1.24*   BUN mg/dL 32* 18 16 18 19 25*   BUN / CREAT RATIO  29.6* 17.0 16.5 16.8 20.4 20.2   CALCIUM mg/dL 9.4 9.6 9.4 9.5 8.9 9.4   EGFR IF NONAFRICN AM mL/min/1.73 51* 52* 58* 51* 61 43*   ALK PHOS U/L 91 99 95 103 90 100   TOTAL PROTEIN g/dL 7.2 7.0 7.3 7.1 6.8 7.3   ALT (SGPT) U/L 20 17 18 19 24 28   AST (SGOT) U/L 16 21 22  22 26 26   BILIRUBIN mg/dL 0.3 0.3 0.5 0.2 0.3 <0.2   ALBUMIN g/dL 4.10 4.10 4.20 4.20 3.90 4.40   GLOBULIN gm/dL 3.1 2.9 3.1 2.9 2.9 2.9         PAST MEDICAL HISTORY:  ALLERGIES:  Allergies   Allergen Reactions   • Nsaids Other (See Comments)     Raises blood pressure.      CURRENT MEDICATIONS:  Outpatient Encounter Medications as of 2/25/2021   Medication Sig Dispense Refill   • cephalexin (KEFLEX) 250 MG capsule Take 250 mg by mouth 4 (Four) Times a Day.     • cetirizine (zyrTEC) 10 MG tablet Take 10 mg by mouth Daily.     • irbesartan (AVAPRO) 150 MG tablet Take 150 mg by mouth Every Night. 1/2 am and 1/2 pm     • levothyroxine (SYNTHROID, LEVOTHROID) 88 MCG tablet Take 88 mcg by mouth Daily.     • omeprazole (priLOSEC) 20 MG capsule Take 20 mg by mouth Daily.     • predniSONE (DELTASONE) 20 MG tablet Take 4 tablets by mouth Daily. (Patient taking differently: Take 70 mg by mouth Daily. Take for 10 days.) 28 tablet 0   • rivaroxaban (XARELTO) 20 MG tablet Take 20 mg by mouth Daily.     • rosuvastatin (CRESTOR) 10 MG tablet Take 10 mg by mouth Daily.     • sertraline (ZOLOFT) 100 MG tablet Take 100 mg by mouth Daily.     • traZODone (DESYREL) 150 MG tablet Take 37.5-50 mg by mouth Every Night.     • [DISCONTINUED] guaiFENesin-codeine (Virtussin A/C) 100-10 MG/5ML liquid Take 5 mL by mouth 3 (Three) Times a Day As Needed for Cough.     • [DISCONTINUED] LORazepam (ATIVAN) 0.5 MG tablet Take 0.5 mg by mouth Every 8 (Eight) Hours As Needed for Anxiety.     • [DISCONTINUED] Omega-3 Fatty Acids (FISH OIL) 1000 MG capsule capsule Take 1,000 mg by mouth Daily With Breakfast.     • [DISCONTINUED] prochlorperazine (COMPAZINE) 10 MG tablet Take 10 mg by mouth Every 6 (Six) Hours As Needed for Nausea or Vomiting.     • [DISCONTINUED] promethazine (PHENERGAN) 6.25 MG/5ML syrup Take  by mouth 4 (Four) Times a Day As Needed for Nausea or Vomiting.       No facility-administered encounter medications on file as of 2/25/2021.       ADULT ILLNESSES:  Patient Active Problem List   Diagnosis Code   • Pulmonary embolus (CMS/HCC) I26.99   • Sinus tachycardia R00.0   • Adenocarcinoma, lung (CMS/HCC) C34.90   • Chronic cough R05   • Stage 3 chronic kidney disease (CMS/[facility]) N18.30   • Anxiety F41.9   • Asthma J45.909   • Essential hypertension I10   • Hyperlipidemia E78.5   • Hypothyroidism E03.9   • Lung mass R91.8   • Obstructive sleep apnea syndrome G47.33   • Osteoarthritis M19.90   • Osteopenia M85.80   • Overweight E66.3   • Solitary pulmonary nodule R91.1   • Function kidney decreased N28.9   • Hypokalemia E87.6     SURGERIES:  Past Surgical History:   Procedure Laterality Date   • BREAST BIOPSY     • COLONOSCOPY  01/2010   • COLONOSCOPY  11/23/2016   • LUNG BIOPSY  08/12/2020     HEALTH MAINTENANCE ITEMS:  Health Maintenance Due   Topic Date Due   • COLONOSCOPY  1955   • ZOSTER VACCINE (1 of 2) 07/07/2005   • HEPATITIS C SCREENING  05/15/2017   • ANNUAL WELLNESS VISIT  05/15/2017   • MAMMOGRAM  05/15/2017   • PAP SMEAR  05/15/2017   • LIPID PANEL  05/15/2017   • Pneumococcal Vaccine 65+ (1 of 1 - PPSV23) 07/07/2020   • INFLUENZA VACCINE  08/01/2020       <no information>  Last Completed Colonoscopy       Status Date      COLONOSCOPY No completions recorded          There is no immunization history on file for this patient.  Last Completed Mammogram       Status Date      MAMMOGRAM No completions recorded            FAMILY HISTORY:  Family History   Problem Relation Age of Onset   • Parkinsonism Mother    • Aneurysm Father      SOCIAL HISTORY:  Social History     Socioeconomic History   • Marital status:      Spouse name: Not on file   • Number of children: Not on file   • Years of education: Not on file   • Highest education level: Not on file   Tobacco Use   • Smoking status: Former Smoker     Packs/day: 0.50   • Smokeless tobacco: Never Used   Substance and Sexual Activity   • Alcohol use: Yes     Comment: occasional    •  "Drug use: Never   • Sexual activity: Defer       REVIEW OF SYSTEMS:  Review of Systems   Constitutional: Positive for activity change (tires easier), appetite change (not as hungry) and fatigue (tires faster). Negative for chills, diaphoresis, fever, unexpected weight gain and unexpected weight loss.        Manages her ADLs, including chores, but has not been running errands.  Is again driving.  Is up at least ~ 50%    Durvalumab tolerance:   Has had a persistent dry cough \"all day now.\" Now has had 2 full weeks of prednisone and is on the third week after symptoms recurred after receipt of C3 requiring another round of steroids (pneumonitis interstitial lung disease), with [place].  No jaundice (hepatitis), no diarrhea (colitis), some worsening fatigue (hypothyroidism, hyperthyroidism, thyroiditis, hypopituitarism, hemolytic anemia), no bruising (thrombocytopenic purpura, nephritis), no headaches (aseptic meningitis), no chest pain (myocarditis), no fever (severe infection), no vision changes (uveitis), no infusion reactions, no skin reaction, no musculoskeletal pain, no constipation, no decreased appetite, no nausea, no edema, no urinary tract infection, no abdominal pain, no pyrexia.   HENT: Negative for rhinorrhea, sinus pressure, sore throat, swollen glands (resolved with tapering decadron ), trouble swallowing (odynophagia.  resolved on steroids) and voice change (hoarseness resolved).    Eyes: Negative.    Respiratory: Positive for cough (dry) and shortness of breath (exertional dyspnea and no sob with routine activities).         No tobacco use   Cardiovascular: Negative.    Gastrointestinal: Negative.  Negative for constipation, diarrhea, nausea and vomiting.   Endocrine: Negative.    Genitourinary: Negative.    Musculoskeletal: Positive for arthralgias (hands, feet, back, \"arthritis\") and back pain. Negative for joint swelling (ankle swelling resolved) and myalgias (leg cramps at night in the past).   Skin: " "Negative.    Neurological: Positive for tremors (\"come and go\"), numbness (hands) and memory problem (forgetfulness since the stroke, \"comes and goes\").   Hematological: Negative.    Psychiatric/Behavioral: Positive for depressed mood (situational). The patient is nervous/anxious.        /70   Pulse 107   Temp 97.4 °F (36.3 °C)   Resp 16   Ht 165.1 cm (65\")   Wt 75.4 kg (166 lb 3.2 oz)   LMP  (LMP Unknown)   SpO2 94%   Breastfeeding No   BMI 27.66 kg/m²  Body surface area is 1.83 meters squared.  Pain Score    02/25/21 1343   PainSc: 0-No pain       Physical Exam:  Physical Exam  Vitals signs reviewed.   Constitutional:       Appearance: Normal appearance.      Comments: Pleasant, cooperative, heavy-set, well kept female, ambulatory, ECOG 1-2.      Has gained 3 pounds since her prior visit    HENT:      Head: Normocephalic and atraumatic.      Mouth/Throat:      Mouth: Mucous membranes are moist.      Pharynx: No oropharyngeal exudate.      Comments: Wearing a surgical mask today  Eyes:      General: No scleral icterus.     Extraocular Movements: Extraocular movements intact.      Pupils: Pupils are equal, round, and reactive to light.   Neck:      Musculoskeletal: Normal range of motion and neck supple.   Cardiovascular:      Rate and Rhythm: Normal rate and regular rhythm.   Pulmonary:      Effort: Pulmonary effort is normal.      Breath sounds: Normal breath sounds. No stridor. No wheezing, rhonchi or rales.   Abdominal:      General: Abdomen is flat.      Palpations: Abdomen is soft.      Tenderness: There is no abdominal tenderness. There is no guarding.   Musculoskeletal: Normal range of motion.         General: No swelling.      Right lower leg: No edema.      Left lower leg: No edema.   Lymphadenopathy:      Cervical: No cervical adenopathy.   Skin:     General: Skin is warm.      Coloration: Skin is not jaundiced or pale.      Findings: No bruising, erythema, lesion or rash.   Neurological: "      General: No focal deficit present.      Mental Status: She is alert and oriented to person, place, and time.      Cranial Nerves: No cranial nerve deficit.   Psychiatric:         Mood and Affect: Mood normal.         Behavior: Behavior normal.         Thought Content: Thought content normal.         ASSESSMENT:   1.  Cough, not otherwise specified. 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  --01/26/2021-chest x-ray.  Right perihilar infiltrate new compared to prior CT study.  May be post radiation versus infection.  Blunting right costophrenic angle suggesting small right pleural effusion.  Also new compared to prior CT.  Old granulomatous disease.  --02/03/2021-chest x-ray-persistent right perihilar and suprahilar opacity similar to 01/26/2021  --02/10/2021-chest x-ray-right perihilar infiltrate unchanged from 2/3/2021.  --02/22/2021-CT chest with contrast.  New groundglass infiltrates present in the right lung suspicious for viral pneumonia.  Concerning for Covid pneumonia.  This is a change from 12/14/2020.  New 8 mm nodule in the region of the lingula possibly lymph node, continued follow-up suggested.  Nodule described in the right lung on prior exam is 12/14/2020 resolved.    2.  Lung adenocarcinoma.                Stage: IIIC (cT4, cN3, M0)         Tumor burden: 3 x 1.6 cm right perihilar mass confluent with mediastinal and hilar lymphadenopathy (chest CT 7/23/2020).  Path with hypermetabolic right supraclavicular node, mediastinal and hilar lymph nodes confluent with right perihilar primary lung malignancy.  Periesophageal lymph node involvement.  Right middle lobe and right lower lobe lesions with minimal FDG avidity.          PD-L1-40% (+)         CEA: 3.13, 12/22/2020.         Tumor status:   --Concurrent chemoradiation therapy, first cycle of carboplatin pemetrexed, 10/2/2020.  Subsequent chemo delayed (due 10/23/2020) due to hospitalization for stroke.  --C2, 10/27/2020 and C3, 11/17/2020  (carboplatin/taxol)  --As of 11/02/2020 had received 17 fractions of radiation - completed 30 fx 11/18/2020  --Maintenance durvalumab, 12/31/2020 (C1); 01/14/2020 (C2); 02/10/2021 (C3)  --02/22/2021-CT chest (above).    3.  Ischemic stroke.  4.  Extensive bilateral pulmonary embolism.  Remains on Xarelto.  5.  Post chemotherapy associated throat/submandibular swelling.   6.  Chronic kidney disease, stage III. GFR 51 mL/min, 02/10/2021 (prior range: 43-58)  7. Normocytic anemia, multifactorial with components from malignancy (anemia of chronic disease), chemotherapy, and chronic kidney disease. Stable, hemoglobin 12.9; MCV 96.4, 02/10/2021 (prior range: Hemoglobin 10.7-12.8; MCV 88.3-93.4)..  8.  Hyponatremia.  Resolved, 139, 02/10/2021 (prior: 131-139).  9.  Depression.  On Zoloft.            RECOMMENDATIONS:   1.  Apprised of labs, 02/10/2021. Note the stable GFR, borderline hyponatremia, otherwise normal CMP, depressed TSH, normal T4, normal cortisol, neutrophilic leukocytosis (steroids) otherwise normal CBC.   2.   Apprised of chest x-rays, 01/26/2021, 02/03/2021, 02/10/2021 and CT of the chest, 02/22/2021 (above) showing persistent right perihilar infiltrate.  3.   Apprised of COVID 19 swab, 02/24/2021-negative  4.   Care discussed with MENDY Taylor who saw the patient on 02/23/2021.  We resumed prednisone 1 mg/per kilogram x1 week and will taper over the next several weeks.  Had also added empiric oral antibiotic.  5.   Durvalumab tolerance discussed.  Due to the persistent lung infiltrate and cough requiring at least 3 weeks (now on fourth week) of steroids, will discontinue durvalumab.    6.   Previously reviewed NCCN guidelines version 6.2020 non-small cell lung cancer, stage III (unresectable).  Chemotherapy regimens used.  Radiation therapy (concurrent regimens usually radiation therapy): Paclitaxel 45 to 50 mg/m² weekly; carboplatin AUC, concurrent thoracic RT +/- additional 2 cycles every 21 days  of paclitaxel 200 mg/m² and carboplatin AUC 6-followed by consolidation therapy for unresectable stage III NSCLC, PS 0-1 and no disease progression after 2 or more cycles of definitive chemoradiation: Durvalumab 10 mg/kg IV every 2 weeks for up to 12 months (category 1).    7. Previously discussed the potential toxicities of durvalumab to include but not limited to (immune mediated reaction, pneumonitis interstitial lung disease, hepatitis, colitis, severe diarrhea, hypothyroidism, hyperthyroidism, thyroiditis, type 1 diabetes, hypopituitarism, thrombocytopenic purpura, nephritis, aseptic meningitis, hemolytic anemia, myocarditis, severe infection, uveitis, infusion reaction, electrolyte abnormalities, lymphopenia, anemia, skin reaction, fatigue, upper respiratory infection, musculoskeletal pain, constipation, decreased appetite, nausea, edema, urinary tract infection, abdominal pain, pyrexia, dyspnea, rash, cough).     8.  STOP durvalumab  9.  Reappoint to Dr. Escobedo Re: Abnormal chest CT     10. Rx:  Prednisone 80 mg po daily x 7 days then 40 mg po daily x 7 days, then 20 mg po daily x 7 days then 10 mg po daily x 7 days then stop.               Mucinex 600 mg po bid # 30    10. Keep appointment with  on 03/18/2021 to see radiation oncology and for surveillance scans  11.  Rx:  Return to office in 4 weeks with pre-office chest x-ray, CEA, CBC and differential, CMP.      I spent - 81 total minutes, face-to-face, caring for Juana harris.  Greater than 50% of this time involved counseling and/or coordination of care as documented within this note regarding the patient's illness(es), pros and cons of various treatment options, instructions and/or risk reduction.

## 2021-02-22 NOTE — TELEPHONE ENCOUNTER
Received call from patient wife, she calls to report that she is getting very concerned regarding Juana's condition. She says that she has completed Z-pk and there is absolutely no changes in how she feels, she continues to have be very sob, tired and is just not doing well. She says that she is seema for CT Scan this afternoon. She feels that Juana needs to be seen sooner than her seema apt on Thursday 2/25/21.  Questioned if Juana has seen her PCP regarding this matter, she says No, this a cancer issue. I then suggested that she might go to the ER Dept for evaluation of her symptoms but she says no, they were told not to go there.      Would you like to move her apt to earlier date?

## 2021-02-23 NOTE — TELEPHONE ENCOUNTER
Follow up call to patient wife, Sera regarding Dr Metz insistence, he is very concerned that her recent CT Chest (2/22/21) looks so bad she needs to be seen and evaluated now. Sera was informed that Dr Metz is very adamant that Juana be taken to the ER dept NOW for evaluation of her symptoms, sob, chest congestion, not feeling well for weeks. Sera v/u of this instruction but says she just had gotten off the phone with Dr Vega office and they are going to make arrangements for her to be tested for COVID.  Sera says she is very confused, she has two Physicians telling her different things and she is unsure what they need to do. Again Srea was told Dr Metz feels very STRONGLY that Juana needs to be taken to the ER dept now. Sera v/u of information

## 2021-02-23 NOTE — TELEPHONE ENCOUNTER
Called and spoke with patient wife Sera, explained that Dr Metz has reviewed patient Juana Erickson recent CT Chest results.   Impression:  1. New ground glass infiltrates area present in the right lung, suspicious for viral pneumonia, this is concerning for Covid pneumonia, this is a change from Dec 14, 2020.   2. New 8 mm nodule in the region the lingula possibly a lymph node continued follow-up is suggested.   3. Nodule described in the right lung or prior exam of Dec 14, 2020 is resolved.     Encouraged Sera to call Juana's PCP Dr Sheldon to seek advise and information how to address the possible viral pneumonia/Covid pneumonia. Patient has been on three antibiotics in the past and is unable to seem to kick the upper resp type symptoms. Sera will contact Dr Sheldon with this information and ask what their Covid Protocol policy is.   Copy of today's CT Scan was faxed to Dr Sheldon office for review  Encouraged patient wife, Sera to please take patient Juana to the ER Dept if she has any breathing issues immediately for evaluation. She v/u of information.

## 2021-02-23 NOTE — TELEPHONE ENCOUNTER
----- Message from Magan Metz MD sent at 2/22/2021  6:41 PM CST -----  2/22/2021–CT chest with new groundglass infiltrates present in the right lung suspicious for viral pneumonia, concerning for Covid pneumonia.  This is a change from 12/14/2020.  New 8 mm nodule in the region of the lingula possibly lymph node.  Continued follow-up suggested.  Nodule in the right lung resolved.  Please instruct patient to come to the ASAP ER, especially if symptomatic.

## 2021-02-24 NOTE — TELEPHONE ENCOUNTER
Follow up call to check on patient Juana Erickson to see how she is doing today, wife Antonia says that Juana did have Covid Testing performed yesterday and they were called today with (NEGATIVE RESULTS) by Dr Vega office. He is now going to treat her for the Viral Pneumonia and is developing her treatment plan and will call her back this afternoon 2/24/21

## 2021-03-03 NOTE — TELEPHONE ENCOUNTER
Received call from patient Juana Erickson, she request clarification of her Prednisone dosing,.  She reports that Dr Sheldon had originally written Prednisone 70 mg daily. Juanaabhishek duenas prescription written by Dr Metz later date for tapering dose was:  Prednisone 80 mg daily x 7 days, then Prednisone 40 mg daily x 7 days, then, Prednisone 20 daily x 7 days, then Prednisone 10 mg daily x 7 days.   Patient questions to start Dr Metz prescription at Prednisone 80 mg daily or to go to Prednisone 40 mg daily    Per Dr Metz instruction, have patient start the dose reduction @ Prednisone 40 mg daily x 7 days and start dose reduction from here.  Relayed this medication clarification to patient and she v/u of instruction, patient encouraged to call if she has other questions or concerns. She v/u of instruction.

## 2021-03-07 NOTE — PROGRESS NOTES
Background:  Pt w GG infiltrate through right lung, new lingula nodule 1/2021   Chief Complaint  abnormal chest ct    Subjective    History of Present Illness {CC  Problem List  Visit  Diagnosis   Encounters  Notes  Medications  Labs  Result Review Imaging  Media :23}     Juana Erickson presents to [facility].  She has a history of immunotherapy for cancer.  She developed respiratory problems and this was felt likely to be pneumonitis related to therapy.  Therapy was held and she was started on prednisone.  I am asked to see her.  She reports developing illness in February 2021 with cough and shortness of breath.  She is much improved with all of the symptoms after treatment above.  She denies other chronic pulmonary problems.  Imaging has identified a groundglass infiltrate which is quite impressive.  This was in late February.  Dr. Lopez was asked me to see her.  She has been on prednisone and now at the 20 mg/day dose.  CT images reviewed by me as noted below.       has a past medical history of Arthropathy, Depression, Hyperlipidemia, Hypertension, Hypokalemia (12/31/2020), Myalgia, and Small cell lung cancer in adult (CMS/HCC).   has a past surgical history that includes Breast biopsy; Colonoscopy (01/2010); Lung biopsy (08/12/2020); and Colonoscopy (11/23/2016).  family history includes Aneurysm in her father; Asthma in her brother; Cancer in her sister and sister; Fibromyalgia in her brother; Glaucoma in her paternal grandmother; Heart attack in her paternal grandfather and paternal grandmother; Heart disease in her paternal grandfather; Hypertension in her brother, maternal grandfather, maternal grandmother, paternal grandmother, and sister; No Known Problems in her sister; Osteoporosis in her brother, brother, brother, maternal grandmother, and sister; Parkinsonism in her mother; Pneumonia in her maternal grandmother; Polymyalgia rheumatica in  "her brother; Stroke in her paternal grandfather.   reports that she quit smoking about 20 years ago. She has a 0.50 pack-year smoking history. She has never used smokeless tobacco. She reports current alcohol use. She reports that she does not use drugs.  Allergies   Allergen Reactions   • Nsaids Other (See Comments)     Raises blood pressure.        Objective     Vital Signs:   /70   Pulse 112   Temp 96.9 °F (36.1 °C)   Ht 165.1 cm (65\")   Wt 73.9 kg (163 lb)   SpO2 97% Comment: RA  BMI 27.12 kg/m²   Physical Exam  Pulmonary:      Effort: Pulmonary effort is normal.      Comments: Minimal coarseness on right side.  No rhonchi or wheezes.       Result Review  Data Reviewed:{ Labs  Result Review  Imaging  Med Tab  Media :23}   CT Chest With Contrast Diagnostic (02/22/2021 16:26)  My interpretation of radiograph: ground glass infiltrate on right, nodule lingula                     Assessment and Plan {CC Problem List  Visit Diagnosis  ROS  Review (Popup)  Health Maintenance  Quality  BestPractice  Medications  SmartSets  SnapShot Encounters  Media :23}   Problem List Items Addressed This Visit        Pulmonary Problems    Adenocarcinoma, lung (CMS/HCC)    Relevant Medications    fluticasone (FLOVENT HFA) 110 MCG/ACT inhaler    Solitary pulmonary nodule      Other Visit Diagnoses     Ground glass opacity present on imaging of lung    -  Primary    Relevant Medications    fluticasone (FLOVENT HFA) 110 MCG/ACT inhaler    Other Relevant Orders    CT Chest Without Contrast Diagnostic      Home she appears to have most likely chemotherapy induced pneumonitis or possibly some other sort of infectious or inflammatory pneumonitis which is responding to steroids and holding chemotherapy.  I recommend she continue with the slow steroid taper as she is getting.  We may need to extend this.  We did discuss also the nodule in the left lung.  This is likely lymph node or other inflammatory focus.  That " will warrant follow-up.  I like to recheck a CT of the chest following completion of treatment to verify resolution.  We may need to extend the prednisone course although she reports difficulty tolerating it which is not surprising.  We will add inhaled corticosteroids in the event that that may help reduce her need for systemic steroids.  She does know how to operate a metered-dose inhaler.  We will see her back after the next CT.  I will schedule her on my schedule for nurse practitioner schedule and I will go in and see her also then.    Follow Up {Instructions Charge Capture  Follow-up Communications :23}   Return in about 27 days (around 4/5/2021).  Patient was given instructions and counseling regarding her condition or for health maintenance advice. Please see specific information pulled into the AVS if appropriate.    Electronically signed by José Escobedo MD, 3/9/2021, 21:12 CST

## 2021-03-08 NOTE — TELEPHONE ENCOUNTER
Alexa rouse from [facility] (Dr. Jazmyn Lopez)    They received records except for pathology report.      Please fax this to Alexa at  [phone]

## 2021-03-10 NOTE — TELEPHONE ENCOUNTER
Patient states that she checked with her insurance company and they will cover the Arnuity. She is asking for it to be sent to her pharmacy in place of the [facility]ov[facility].

## 2021-03-18 NOTE — TELEPHONE ENCOUNTER
Caller: PATIENT    Relationship to patient:     Best call back number: [phone]    Chief complaint:PATIENT HAS A LAB APPT TOMORROW 3-19-21 BUT IS OUT OF TOWN TIL 3-25-21, NEEDS TO RESCHEDULE TO 3-26-21 OR AFTER, PLEASE ADVISE?    Type of visit: LAB    Requested date: 3-26-21 OR AFTER    If rescheduling, when is the original appointment: 3-19-21    Additional notes:

## 2021-03-22 NOTE — TELEPHONE ENCOUNTER
Patient states she is tapering off the prednisone tomorrow and is on day 2 of the Arnuity 100's. She states it does not seem to be helping and wanted to see if she should do something else. She has noticed increased shortness of breath and wheezing.